# Patient Record
Sex: FEMALE | Race: WHITE | NOT HISPANIC OR LATINO | Employment: FULL TIME | URBAN - NONMETROPOLITAN AREA
[De-identification: names, ages, dates, MRNs, and addresses within clinical notes are randomized per-mention and may not be internally consistent; named-entity substitution may affect disease eponyms.]

---

## 2017-03-02 ENCOUNTER — OFFICE VISIT (OUTPATIENT)
Dept: OPHTHALMOLOGY | Facility: CLINIC | Age: 32
End: 2017-03-02

## 2017-03-02 DIAGNOSIS — H52.203 ASTIGMATISM, BILATERAL: ICD-10-CM

## 2017-03-02 DIAGNOSIS — F33.9 EPISODE OF RECURRENT MAJOR DEPRESSIVE DISORDER, UNSPECIFIED DEPRESSION EPISODE SEVERITY (HCC): ICD-10-CM

## 2017-03-02 DIAGNOSIS — H53.8 BLURRED VISION: Primary | ICD-10-CM

## 2017-03-02 DIAGNOSIS — H52.13 MYOPIA, BILATERAL: ICD-10-CM

## 2017-03-02 PROBLEM — H52.10 MYOPIA: Status: ACTIVE | Noted: 2017-03-02

## 2017-03-02 PROBLEM — H52.209 ASTIGMATISM: Status: ACTIVE | Noted: 2017-03-02

## 2017-03-02 PROCEDURE — 92002 INTRM OPH EXAM NEW PATIENT: CPT | Performed by: OPHTHALMOLOGY

## 2017-03-02 NOTE — PROGRESS NOTES
Subjective   Iris Alan is a 31 y.o. female.   Chief Complaint   Patient presents with   • Blurred Vision   • Eye Exam     HPI     Blurred Vision   Laterality: both eyes   Onset: sudden   Quality: difficult to focus   Onset: 1 day ago   Timing: in the morning           Comments   BV OU, part at near, off/on last mins x 3 days, slight soreness, wears SCL; hx of HAs and depression on Maxalt for 3 months       Last edited by Tomasz Minaya MD on 3/2/2017  5:17 PM. (History)          Review of Systems   Eyes: Positive for visual disturbance.   Neurological: Positive for headaches.   Psychiatric/Behavioral: Positive for dysphoric mood.       Objective   Visual Acuity (Snellen - Linear)      Right Left   Dist cc 20/40 20/40       Correction:  Contacts    Slow responses; OR pls OU         Manifest Refraction      Sphere Cylinder Axis Dist   Right -8.00 +2.00 080 20/30+   Left -8.75 +2.75 080 20/30+2               Pupils      Pupils   Right PERRL   Left PERRL           Confrontational Visual Fields     Visual Fields      Left Right   Result Full Full                  Extraocular Movement      Right Left   Result Full Full              Tonometry (Applanation, 5:02 PM)      Right Left   Pressure 13 13           Neuro/Psych     Mood/Affect:  Depressed         Main Ophthalmology Exam     External Exam      Right Left    External Normal Normal      Slit Lamp Exam      Right Left    Lids/Lashes Normal Normal    Conjunctiva/Sclera White and quiet White and quiet    Cornea Clear, SCL no rot, good movt Clear SCL rot 30 degr R, good movt    Anterior Chamber Deep and quiet Deep and quiet    Iris Round and reactive Round and reactive    Lens Clear Clear    Vitreous Normal Normal      Fundus Exam      Right Left    Disc Normal Normal    Macula Normal Normal    Vessels Normal Normal                Assessment/Plan   Diagnoses and all orders for this visit:    Blurred vision    Myopia, bilateral    Astigmatism,  bilateral    Episode of recurrent major depressive disorder, unspecified depression episode severity    rec no CL wear, glasses; f/u for refit if vision not improved       No Follow-up on file.

## 2017-03-08 ENCOUNTER — LAB (OUTPATIENT)
Dept: LAB | Facility: HOSPITAL | Age: 32
End: 2017-03-08

## 2017-03-08 ENCOUNTER — TELEPHONE (OUTPATIENT)
Dept: OBSTETRICS AND GYNECOLOGY | Facility: CLINIC | Age: 32
End: 2017-03-08

## 2017-03-08 DIAGNOSIS — N89.8 VAGINAL DISCHARGE: ICD-10-CM

## 2017-03-08 DIAGNOSIS — N89.8 VAGINAL DISCHARGE: Primary | ICD-10-CM

## 2017-03-08 LAB
CANDIDA ALBICANS: NEGATIVE
GARDNERELLA VAGINALIS: NEGATIVE
TRICHOMONAS VAGINALIS PCR: NEGATIVE

## 2017-03-08 PROCEDURE — 87481 CANDIDA DNA AMP PROBE: CPT | Performed by: NURSE PRACTITIONER

## 2017-03-08 PROCEDURE — 87512 GARDNER VAG DNA QUANT: CPT | Performed by: NURSE PRACTITIONER

## 2017-03-08 PROCEDURE — 87661 TRICHOMONAS VAGINALIS AMPLIF: CPT | Performed by: NURSE PRACTITIONER

## 2017-03-08 RX ORDER — OSELTAMIVIR PHOSPHATE 75 MG/1
75 CAPSULE ORAL DAILY
Qty: 10 CAPSULE | Refills: 0 | Status: SHIPPED | OUTPATIENT
Start: 2017-03-08 | End: 2017-03-18

## 2017-03-08 NOTE — TELEPHONE ENCOUNTER
----- Message from Jennifer Horan sent at 3/8/2017  9:06 AM CST -----  Contact: [phone]  Patient is wanting to an order for a swab put in.

## 2017-04-27 ENCOUNTER — OFFICE VISIT (OUTPATIENT)
Dept: FAMILY MEDICINE CLINIC | Facility: CLINIC | Age: 32
End: 2017-04-27

## 2017-04-27 VITALS
OXYGEN SATURATION: 96 % | HEIGHT: 69 IN | SYSTOLIC BLOOD PRESSURE: 116 MMHG | DIASTOLIC BLOOD PRESSURE: 80 MMHG | HEART RATE: 113 BPM

## 2017-04-27 DIAGNOSIS — H60.12 CELLULITIS OF EAR, LEFT: Primary | ICD-10-CM

## 2017-04-27 PROCEDURE — 99213 OFFICE O/P EST LOW 20 MIN: CPT | Performed by: FAMILY MEDICINE

## 2017-04-27 RX ORDER — CEPHALEXIN 500 MG/1
500 CAPSULE ORAL 2 TIMES DAILY
Qty: 20 CAPSULE | Refills: 0 | Status: SHIPPED | OUTPATIENT
Start: 2017-04-27 | End: 2017-05-07

## 2017-06-04 PROBLEM — H60.10 CELLULITIS OF EAR: Status: ACTIVE | Noted: 2017-06-04

## 2017-06-21 ENCOUNTER — LAB (OUTPATIENT)
Dept: LAB | Facility: HOSPITAL | Age: 32
End: 2017-06-21

## 2017-06-21 DIAGNOSIS — L50.9 URTICARIA: ICD-10-CM

## 2017-06-21 PROCEDURE — 86003 ALLG SPEC IGE CRUDE XTRC EA: CPT | Performed by: FAMILY MEDICINE

## 2017-06-21 PROCEDURE — 86038 ANTINUCLEAR ANTIBODIES: CPT | Performed by: FAMILY MEDICINE

## 2017-06-24 LAB
A ALTERNATA IGE QN: <0.1 KU/L
A FUMIGATUS IGE QN: <0.1 KU/L
AMER ROACH IGE QN: <0.1 KU/L
BAHIA GRASS IGE QN: <0.1 KU/L
BAYBERRY POLN IGE QN: <0.1 KU/L
BEEF IGE QN: <0.1 KU/L
BERMUDA GRASS IGE QN: <0.1 KU/L
BOXELDER IGE QN: <0.1 KU/L
C HERBARUM IGE QN: <0.1 KU/L
CAT DANDER IGG QN: <0.1 KU/L
COCOA IGE QN: <0.1 KU/L
COMMON RAGWEED IGE QN: <0.1 KU/L
CONV CLASS DESCRIPTION: NORMAL
CONV CLASS DESCRIPTION: NORMAL
CORN IGE QN: <0.1 KU/L
COW MILK IGE QN: <0.1 KU/L
D FARINAE IGE QN: <0.1 KU/L
D PTERONYSS IGE QN: <0.1 KU/L
DOG DANDER IGE QN: <0.1 KU/L
DOG FENNEL IGE QN: <0.1 KU/L
ENGL PLANTAIN IGE QN: <0.1 KU/L
FOOD ALLERG MIX2 IGE QL: NEGATIVE
GOOSEFOOT IGE QN: <0.1 KU/L
GUM-TREE IGE QN: <0.1 KU/L
ITALIAN CYPRESS IGE QN: <0.1 KU/L
JOHNSON GRASS IGE QN: <0.1 KU/L
M RACEMOSUS IGE QN: <0.1 KU/L
P NOTATUM IGE QN: <0.1 KU/L
PEANUT IGE QN: <0.1 KU/L
PEPPER TREE IGE QN: <0.1 KU/L
PER RYE GRASS IGE QN: <0.1 KU/L
PORK IGE QN: <0.1 KU/L
QUEEN PALM IGE QN: <0.1 KU/L
S BOTRYOSUM IGE QN: <0.1 KU/L
SHEEP SORREL IGE QN: <0.1 KU/L
SOYBEAN IGE QN: <0.1 KU/L
T210-IGE PRIVET, COMMON: <0.1 KU/L
VIRG LIVE OAK IGE QN: <0.1 KU/L
WHEAT IGE QN: <0.1 KU/L
WHITE ELM IGE QN: <0.1 KU/L
WHOLE EGG IGE QN: <0.1 KU/L

## 2017-06-26 LAB
ALPHA GAL IGE: <0.1 KU/L
BEEF IGE QN: <0.1 KU/L
LAMB IGE QN: <0.1 KU/L
Lab: 0
PORK IGE: <0.1 KU/L

## 2017-07-28 ENCOUNTER — LAB (OUTPATIENT)
Dept: LAB | Facility: HOSPITAL | Age: 32
End: 2017-07-28
Attending: SURGERY

## 2017-07-28 ENCOUNTER — CONSULT (OUTPATIENT)
Dept: SURGERY | Facility: CLINIC | Age: 32
End: 2017-07-28

## 2017-07-28 ENCOUNTER — APPOINTMENT (OUTPATIENT)
Dept: LAB | Facility: HOSPITAL | Age: 32
End: 2017-07-28

## 2017-07-28 ENCOUNTER — HOSPITAL ENCOUNTER (OUTPATIENT)
Dept: CT IMAGING | Facility: HOSPITAL | Age: 32
Discharge: HOME | End: 2017-07-28
Admitting: SURGERY

## 2017-07-28 ENCOUNTER — DOCUMENTATION (OUTPATIENT)
Dept: SURGERY | Facility: CLINIC | Age: 32
End: 2017-07-28

## 2017-07-28 VITALS
WEIGHT: 259 LBS | DIASTOLIC BLOOD PRESSURE: 84 MMHG | BODY MASS INDEX: 38.36 KG/M2 | SYSTOLIC BLOOD PRESSURE: 122 MMHG | HEIGHT: 69 IN

## 2017-07-28 DIAGNOSIS — R10.84 GENERALIZED ABDOMINAL PAIN: ICD-10-CM

## 2017-07-28 DIAGNOSIS — R10.84 GENERALIZED ABDOMINAL PAIN: Primary | ICD-10-CM

## 2017-07-28 DIAGNOSIS — R10.9 RIGHT FLANK PAIN: Primary | ICD-10-CM

## 2017-07-28 LAB
ALBUMIN SERPL-MCNC: 4.5 G/DL (ref 3.4–4.8)
ALBUMIN/GLOB SERPL: 1.2 G/DL (ref 1.1–1.8)
ALP SERPL-CCNC: 81 U/L (ref 38–126)
ALT SERPL W P-5'-P-CCNC: 40 U/L (ref 9–52)
ANION GAP SERPL CALCULATED.3IONS-SCNC: 12 MMOL/L (ref 5–15)
AST SERPL-CCNC: 30 U/L (ref 14–36)
BACTERIA UR QL AUTO: ABNORMAL /HPF
BASOPHILS # BLD AUTO: 0.02 10*3/MM3 (ref 0–0.2)
BASOPHILS NFR BLD AUTO: 0.2 % (ref 0–2)
BILIRUB SERPL-MCNC: 0.4 MG/DL (ref 0.2–1.3)
BILIRUB UR QL STRIP: NEGATIVE
BUN BLD-MCNC: 18 MG/DL (ref 7–21)
BUN/CREAT SERPL: 24.7 (ref 7–25)
CALCIUM SPEC-SCNC: 10 MG/DL (ref 8.4–10.2)
CHLORIDE SERPL-SCNC: 99 MMOL/L (ref 95–110)
CLARITY UR: CLEAR
CO2 SERPL-SCNC: 28 MMOL/L (ref 22–31)
COLOR UR: YELLOW
CREAT BLD-MCNC: 0.73 MG/DL (ref 0.5–1)
DEPRECATED RDW RBC AUTO: 42.4 FL (ref 36.4–46.3)
EOSINOPHIL # BLD AUTO: 0.13 10*3/MM3 (ref 0–0.7)
EOSINOPHIL NFR BLD AUTO: 1.2 % (ref 0–7)
ERYTHROCYTE [DISTWIDTH] IN BLOOD BY AUTOMATED COUNT: 13.4 % (ref 11.5–14.5)
GFR SERPL CREATININE-BSD FRML MDRD: 92 ML/MIN/1.73 (ref 64–149)
GLOBULIN UR ELPH-MCNC: 3.9 GM/DL (ref 2.3–3.5)
GLUCOSE BLD-MCNC: 93 MG/DL (ref 60–100)
GLUCOSE UR STRIP-MCNC: NEGATIVE MG/DL
HCT VFR BLD AUTO: 39.2 % (ref 35–45)
HGB BLD-MCNC: 13 G/DL (ref 12–15.5)
HGB UR QL STRIP.AUTO: NEGATIVE
HYALINE CASTS UR QL AUTO: ABNORMAL /LPF
IMM GRANULOCYTES # BLD: 0.03 10*3/MM3 (ref 0–0.02)
IMM GRANULOCYTES NFR BLD: 0.3 % (ref 0–0.5)
KETONES UR QL STRIP: NEGATIVE
LEUKOCYTE ESTERASE UR QL STRIP.AUTO: NEGATIVE
LYMPHOCYTES # BLD AUTO: 3.35 10*3/MM3 (ref 0.6–4.2)
LYMPHOCYTES NFR BLD AUTO: 31.9 % (ref 10–50)
MCH RBC QN AUTO: 28.9 PG (ref 26.5–34)
MCHC RBC AUTO-ENTMCNC: 33.2 G/DL (ref 31.4–36)
MCV RBC AUTO: 87.1 FL (ref 80–98)
MONOCYTES # BLD AUTO: 0.61 10*3/MM3 (ref 0–0.9)
MONOCYTES NFR BLD AUTO: 5.8 % (ref 0–12)
NEUTROPHILS # BLD AUTO: 6.35 10*3/MM3 (ref 2–8.6)
NEUTROPHILS NFR BLD AUTO: 60.6 % (ref 37–80)
NITRITE UR QL STRIP: NEGATIVE
PH UR STRIP.AUTO: 6 [PH] (ref 5–9)
PLATELET # BLD AUTO: 351 10*3/MM3 (ref 150–450)
PMV BLD AUTO: 9.6 FL (ref 8–12)
POTASSIUM BLD-SCNC: 4.4 MMOL/L (ref 3.5–5.1)
PROT SERPL-MCNC: 8.4 G/DL (ref 6.3–8.6)
PROT UR QL STRIP: NEGATIVE
RBC # BLD AUTO: 4.5 10*6/MM3 (ref 3.77–5.16)
RBC # UR: ABNORMAL /HPF
REF LAB TEST METHOD: ABNORMAL
SODIUM BLD-SCNC: 139 MMOL/L (ref 137–145)
SP GR UR STRIP: 1.03 (ref 1–1.03)
SQUAMOUS #/AREA URNS HPF: ABNORMAL /HPF
UROBILINOGEN UR QL STRIP: NORMAL
WBC NRBC COR # BLD: 10.49 10*3/MM3 (ref 3.2–9.8)
WBC UR QL AUTO: ABNORMAL /HPF

## 2017-07-28 PROCEDURE — 81001 URINALYSIS AUTO W/SCOPE: CPT

## 2017-07-28 PROCEDURE — 74176 CT ABD & PELVIS W/O CONTRAST: CPT

## 2017-07-28 PROCEDURE — 36415 COLL VENOUS BLD VENIPUNCTURE: CPT

## 2017-07-28 PROCEDURE — 99204 OFFICE O/P NEW MOD 45 MIN: CPT | Performed by: SURGERY

## 2017-07-28 PROCEDURE — 80053 COMPREHEN METABOLIC PANEL: CPT

## 2017-07-28 PROCEDURE — 85025 COMPLETE CBC W/AUTO DIFF WBC: CPT

## 2017-07-28 RX ORDER — HYDROCODONE BITARTRATE AND ACETAMINOPHEN 5; 325 MG/1; MG/1
1 TABLET ORAL EVERY 6 HOURS PRN
COMMUNITY
End: 2017-09-26

## 2017-07-28 RX ORDER — OXYCODONE HYDROCHLORIDE AND ACETAMINOPHEN 5; 325 MG/1; MG/1
1 TABLET ORAL EVERY 6 HOURS PRN
Qty: 20 TABLET | Refills: 0 | Status: SHIPPED | OUTPATIENT
Start: 2017-07-28 | End: 2017-09-26

## 2017-07-28 NOTE — PROGRESS NOTES
CHIEF COMPLAINT:    Right Flank pain, gallstones    HISTORY OF PRESENT ILLNESS:    Iris Alan is a 32 y.o. female who has had 1 week of right flank pain.  She says nothing makes it better or worse.  It has progressively gotten worse over the last week.  She was seen in an urgent care last week and told she had blood in her urine and was given antibiotics for a UTI.  There was no improvement so she saw her PCP earlier this week.  She was given Lortab, again with no improvement.  An ultrasound of the gallbladder was done showing multiple stones but no wall thickening or pericholecystic fluid was seen per the report given to me.      She continues to have constant right flank pain without any relief.  She has some loss of apetite.  No fevers or chills.    Past Medical History:   Diagnosis Date   • Abnormal weight gain    • Acquired keratosis pilaris    • Adult BMI > 30    • Dysuria    • KATHY (generalized anxiety disorder)    • Headache    • Low back pain    • Need for immunization against influenza    • Rash    • Recurrent major depression    • Spasm of back muscles    • Trochanteric tendinitis     bursitis       Past Surgical History:   Procedure Laterality Date   •  SECTION  2006   • HYSTEROSCOPY  2009   • INJECTION OF MEDICATION  2016    kenalog   • INJECTION OF MEDICATION  2016    toradol   • INJECTION OF MEDICATION  2016    zofran   • LAPAROSCOPY DIAGNOSTIC / BIOPSY / ASPIRATION / LYSIS  2013   • PAP SMEAR  2012   • SALPINGO OOPHORECTOMY  2013   • TOTAL ABDOMINAL HYSTERECTOMY  2014         Current Outpatient Prescriptions:   •  HYDROcodone-acetaminophen (NORCO) 5-325 MG per tablet, Take 1 tablet by mouth Every 6 (Six) Hours As Needed., Disp: , Rfl:   •  diphenhydrAMINE (BENADRYL) 25 mg capsule, Take 25 mg by mouth., Disp: , Rfl:   •  oxyCODONE-acetaminophen (PERCOCET) 5-325 MG per tablet, Take 1 tablet by mouth Every 6 (Six) Hours As Needed  (pain)., Disp: 20 tablet, Rfl: 0  •  PredniSONE (DELTASONE) 10 MG (21) tablet pack, Use as directed on package, Disp: 21 tablet, Rfl: 0  •  triamcinolone (KENALOG) 0.1 % cream, Apply  topically 3 (Three) Times a Day., Disp: 15 g, Rfl: 0    No Known Allergies    Family History   Problem Relation Age of Onset   • Depression Mother    • Diabetes Mother    • Breast cancer Maternal Grandmother    • Coronary artery disease Maternal Grandmother    • Diabetes Maternal Grandmother    • Hypertension Maternal Grandmother    • Breast cancer Other        Social History     Social History   • Marital status: Single     Spouse name: N/A   • Number of children: N/A   • Years of education: N/A     Occupational History   • Not on file.     Social History Main Topics   • Smoking status: Former Smoker     Types: Cigarettes   • Smokeless tobacco: Not on file   • Alcohol use No   • Drug use: No   • Sexual activity: Not on file     Other Topics Concern   • Not on file     Social History Narrative       Review of Systems   Constitutional: Negative for appetite change, chills, fever and unexpected weight change.   HENT: Negative for hearing loss, nosebleeds and trouble swallowing.    Eyes: Negative for visual disturbance.   Respiratory: Negative for apnea, cough, choking, chest tightness, shortness of breath, wheezing and stridor.    Cardiovascular: Negative for chest pain, palpitations and leg swelling.   Gastrointestinal: Positive for abdominal pain and nausea. Negative for abdominal distention, blood in stool, constipation, diarrhea and vomiting.   Endocrine: Negative for cold intolerance, heat intolerance, polydipsia, polyphagia and polyuria.   Genitourinary: Positive for difficulty urinating and hematuria. Negative for dysuria, frequency and urgency.   Musculoskeletal: Positive for back pain. Negative for arthralgias, myalgias and neck pain.   Skin: Negative for color change, pallor and rash.   Allergic/Immunologic: Negative for  "immunocompromised state.   Neurological: Negative for dizziness, seizures, syncope, light-headedness, numbness and headaches.   Hematological: Negative for adenopathy.   Psychiatric/Behavioral: Negative for suicidal ideas. The patient is not nervous/anxious.        Objective     /84  Ht 69\" (175.3 cm)  Wt 259 lb (117 kg)  LMP  (LMP Unknown)  BMI 38.25 kg/m2    Physical Exam   Constitutional: She is oriented to person, place, and time. She appears well-developed and well-nourished. She appears distressed.   HENT:   Head: Normocephalic and atraumatic.   Eyes: Conjunctivae and EOM are normal. Pupils are equal, round, and reactive to light. Right eye exhibits no discharge. Left eye exhibits no discharge.   Neck: Normal range of motion. Neck supple. No JVD present. No tracheal deviation present. No thyromegaly present.   Cardiovascular: Normal rate, regular rhythm and normal heart sounds.  Exam reveals no gallop and no friction rub.    No murmur heard.  Pulmonary/Chest: Effort normal and breath sounds normal. No respiratory distress. She has no wheezes. She has no rales. She exhibits no tenderness.   Abdominal: Soft. She exhibits no distension and no mass. There is no tenderness. There is no rebound and no guarding. No hernia.   No tenderness to palpation but patient states pain in right flank and subcostal margin in axillary line. No RUQ pain. No Castellon's sign   Musculoskeletal: Normal range of motion. She exhibits no edema, tenderness or deformity.   Neurological: She is alert and oriented to person, place, and time. No cranial nerve deficit.   Skin: Skin is warm and dry. No rash noted. She is not diaphoretic. No erythema. No pallor.   Psychiatric: She has a normal mood and affect. Her behavior is normal. Judgment and thought content normal.       DIAGNOSTIC DATA:    US report reviewed showing gallstones.    ASSESSMENT:    Right Flank Pain, possible kidney stones    PLAN:    She does have gallstones, though " this presentation does not match symptomatic stones, nor cholecystitis.  I suspect she has a kidney stone given the constant flank pain and the blood reported in her urine.  Will check labs and CT with no contrast to look for stone.  Spoke to her PCP Dr. Brenda García as well.  Rx given for pain meds.  Will make plans pending results of studies.          This document has been electronically signed by Luis Cummins MD on July 28, 2017 3:03 PM

## 2017-08-01 ENCOUNTER — OFFICE VISIT (OUTPATIENT)
Dept: SURGERY | Facility: CLINIC | Age: 32
End: 2017-08-01

## 2017-08-01 VITALS
HEIGHT: 69 IN | DIASTOLIC BLOOD PRESSURE: 74 MMHG | WEIGHT: 262 LBS | BODY MASS INDEX: 38.8 KG/M2 | SYSTOLIC BLOOD PRESSURE: 124 MMHG

## 2017-08-01 DIAGNOSIS — Z09 FOLLOW UP: Primary | ICD-10-CM

## 2017-08-01 PROCEDURE — 99212 OFFICE O/P EST SF 10 MIN: CPT | Performed by: SURGERY

## 2017-08-01 NOTE — PROGRESS NOTES
CHIEF COMPLAINT:    Chief Complaint   Patient presents with   • Follow-up     Marce right flank pain, CT & lab results.       HISTORY OF PRESENT ILLNESS:    Iris Alan is a 32 y.o. female who underwent CT and lab work last week to workup right flank pain.  I attempted to contact her last Friday regarding these results and she was unreachable.  She continues to have pain, which she now says is in the lower lumbar area and is bilateral.  Her CT last week showed only an ovarian cyst.  Her pain is improved somewhat but still present.    EXAM:  Vitals:    08/01/17 1046   BP: 124/74         Abdomen soft, tenderness overlying lower lumbar area    ASSESSMENT:    Lumbar back pain    PLAN:    She does not have a general surgical source for her back, which is now isolated to her back.  I will see her prn if she would like to have her gallbladder removed at some point when her other pain has resolved.          This document has been electronically signed by Luis Cummins MD on August 1, 2017 11:55 AM

## 2017-08-08 ENCOUNTER — TELEPHONE (OUTPATIENT)
Dept: OBSTETRICS AND GYNECOLOGY | Facility: CLINIC | Age: 32
End: 2017-08-08

## 2017-08-08 ENCOUNTER — LAB (OUTPATIENT)
Dept: LAB | Facility: HOSPITAL | Age: 32
End: 2017-08-08

## 2017-08-08 DIAGNOSIS — R30.0 DYSURIA: ICD-10-CM

## 2017-08-08 DIAGNOSIS — N89.8 VAGINAL DISCHARGE: Primary | ICD-10-CM

## 2017-08-08 DIAGNOSIS — N89.8 VAGINAL DISCHARGE: ICD-10-CM

## 2017-08-08 DIAGNOSIS — R30.0 DYSURIA: Primary | ICD-10-CM

## 2017-08-08 LAB
BILIRUB UR QL STRIP: ABNORMAL
CANDIDA ALBICANS: NEGATIVE
CLARITY UR: CLEAR
COLOR UR: YELLOW
GARDNERELLA VAGINALIS: POSITIVE
GLUCOSE UR STRIP-MCNC: NEGATIVE MG/DL
HGB UR QL STRIP.AUTO: ABNORMAL
KETONES UR QL STRIP: ABNORMAL
LEUKOCYTE ESTERASE UR QL STRIP.AUTO: ABNORMAL
NITRITE UR QL STRIP: NEGATIVE
PH UR STRIP.AUTO: 5.5 [PH] (ref 5–9)
PROT UR QL STRIP: NEGATIVE
SP GR UR STRIP: 1.03 (ref 1–1.03)
TRICHOMONAS VAGINALIS PCR: NEGATIVE
UROBILINOGEN UR QL STRIP: ABNORMAL

## 2017-08-08 PROCEDURE — 87660 TRICHOMONAS VAGIN DIR PROBE: CPT | Performed by: NURSE PRACTITIONER

## 2017-08-08 PROCEDURE — 87086 URINE CULTURE/COLONY COUNT: CPT | Performed by: NURSE PRACTITIONER

## 2017-08-08 PROCEDURE — 87510 GARDNER VAG DNA DIR PROBE: CPT | Performed by: NURSE PRACTITIONER

## 2017-08-08 PROCEDURE — 87480 CANDIDA DNA DIR PROBE: CPT | Performed by: NURSE PRACTITIONER

## 2017-08-08 PROCEDURE — 81003 URINALYSIS AUTO W/O SCOPE: CPT | Performed by: NURSE PRACTITIONER

## 2017-08-08 RX ORDER — METRONIDAZOLE 500 MG/1
500 TABLET ORAL 2 TIMES DAILY
Qty: 14 TABLET | Refills: 0 | Status: SHIPPED | OUTPATIENT
Start: 2017-08-08 | End: 2017-08-15

## 2017-08-08 NOTE — TELEPHONE ENCOUNTER
----- Message from Jennifer Horan sent at 8/8/2017 10:23 AM CDT -----  Contact: [phone]  PATIENT IS WANTING TO GET A TEST PUT IN FOR INFECTION IN THE LAB

## 2017-08-08 NOTE — TELEPHONE ENCOUNTER
----- Message from LEN Santiago sent at 8/8/2017  4:05 PM CDT -----  Please send flagyl 500mg po bid x7 days for BV. Urine culture pending.

## 2017-08-09 LAB — BACTERIA SPEC AEROBE CULT: NORMAL

## 2017-09-20 ENCOUNTER — LAB (OUTPATIENT)
Dept: LAB | Facility: HOSPITAL | Age: 32
End: 2017-09-20

## 2017-09-20 ENCOUNTER — TELEPHONE (OUTPATIENT)
Dept: OBSTETRICS AND GYNECOLOGY | Facility: CLINIC | Age: 32
End: 2017-09-20

## 2017-09-20 DIAGNOSIS — R31.9 BLOOD IN URINE: ICD-10-CM

## 2017-09-20 DIAGNOSIS — N89.8 VAGINAL DISCHARGE: Primary | ICD-10-CM

## 2017-09-20 DIAGNOSIS — N89.8 VAGINAL DISCHARGE: ICD-10-CM

## 2017-09-20 LAB
CANDIDA ALBICANS: NEGATIVE
GARDNERELLA VAGINALIS: NEGATIVE
TRICHOMONAS VAGINALIS PCR: NEGATIVE

## 2017-09-20 PROCEDURE — 87660 TRICHOMONAS VAGIN DIR PROBE: CPT | Performed by: NURSE PRACTITIONER

## 2017-09-20 PROCEDURE — 87109 MYCOPLASMA: CPT | Performed by: NURSE PRACTITIONER

## 2017-09-20 PROCEDURE — 87480 CANDIDA DNA DIR PROBE: CPT | Performed by: NURSE PRACTITIONER

## 2017-09-20 PROCEDURE — 87510 GARDNER VAG DNA DIR PROBE: CPT | Performed by: NURSE PRACTITIONER

## 2017-09-25 ENCOUNTER — TELEPHONE (OUTPATIENT)
Dept: OBSTETRICS AND GYNECOLOGY | Facility: CLINIC | Age: 32
End: 2017-09-25

## 2017-09-25 NOTE — TELEPHONE ENCOUNTER
----- Message from LEN Santiago sent at 9/25/2017  9:50 AM CDT -----  Doxycyline 100mg po bid x7 days

## 2017-09-26 ENCOUNTER — APPOINTMENT (OUTPATIENT)
Dept: PREADMISSION TESTING | Facility: HOSPITAL | Age: 32
End: 2017-09-26

## 2017-09-26 ENCOUNTER — OFFICE VISIT (OUTPATIENT)
Dept: SURGERY | Facility: CLINIC | Age: 32
End: 2017-09-26

## 2017-09-26 ENCOUNTER — TELEPHONE (OUTPATIENT)
Dept: OBSTETRICS AND GYNECOLOGY | Facility: CLINIC | Age: 32
End: 2017-09-26

## 2017-09-26 VITALS
SYSTOLIC BLOOD PRESSURE: 128 MMHG | BODY MASS INDEX: 37.92 KG/M2 | DIASTOLIC BLOOD PRESSURE: 90 MMHG | WEIGHT: 256 LBS | HEART RATE: 86 BPM | OXYGEN SATURATION: 100 % | HEIGHT: 69 IN | RESPIRATION RATE: 18 BRPM

## 2017-09-26 VITALS
BODY MASS INDEX: 38.8 KG/M2 | HEIGHT: 69 IN | WEIGHT: 262 LBS | DIASTOLIC BLOOD PRESSURE: 80 MMHG | SYSTOLIC BLOOD PRESSURE: 122 MMHG

## 2017-09-26 DIAGNOSIS — R10.11 RIGHT UPPER QUADRANT ABDOMINAL PAIN: Primary | ICD-10-CM

## 2017-09-26 LAB
MYCOPLASMA HOMINIS: NEGATIVE
UREAPLASMA UREALYTICUM: POSITIVE

## 2017-09-26 PROCEDURE — 99213 OFFICE O/P EST LOW 20 MIN: CPT | Performed by: SURGERY

## 2017-09-26 RX ORDER — NABUMETONE 500 MG/1
500 TABLET, FILM COATED ORAL 2 TIMES DAILY PRN
COMMUNITY
End: 2018-06-14 | Stop reason: HOSPADM

## 2017-09-26 RX ORDER — DOXYCYCLINE HYCLATE 100 MG/1
100 CAPSULE ORAL 2 TIMES DAILY
COMMUNITY
End: 2018-04-03 | Stop reason: SDUPTHER

## 2017-09-26 RX ORDER — DOXYCYCLINE HYCLATE 100 MG/1
100 CAPSULE ORAL 2 TIMES DAILY
Qty: 14 CAPSULE | Refills: 0 | Status: SHIPPED | OUTPATIENT
Start: 2017-09-26 | End: 2017-09-26

## 2017-09-26 RX ORDER — SODIUM CHLORIDE 9 MG/ML
100 INJECTION, SOLUTION INTRAVENOUS CONTINUOUS
Status: CANCELLED | OUTPATIENT
Start: 2017-09-29

## 2017-09-26 NOTE — PROGRESS NOTES
CHIEF COMPLAINT:    Right upper quadrant abdominal pain    HISTORY OF PRESENT ILLNESS:    Iris Alan is a 32 y.o. female who was seen previously for right flank and pelvic pain.  At that time it was felt that she had a urologic issue.  She has been seen by Dr. Wilcox.  The pain is now improved.  During the workup for the patient was noted to have gallstones on ultrasound of the gallbladder.  At that time I did not feel that she was symptomatically stones.  She states that over the past few weeks she's been having increasing right upper quadrant abdominal pain following meals.  This is associated with some nausea.  This is particularly with fatty or greasy meals.  As the pain is becoming more severe and more frequent she has sought reevaluation for possible cholecystectomy for symptomatic gallstones.    Past Medical History:   Diagnosis Date   • Abnormal weight gain    • Acquired keratosis pilaris    • Adult BMI > 30    • Dysuria    • KATHY (generalized anxiety disorder)    • Headache    • Low back pain    • Need for immunization against influenza    • Rash    • Recurrent major depression    • Spasm of back muscles    • Trochanteric tendinitis     bursitis       Past Surgical History:   Procedure Laterality Date   •  SECTION  2006   • HYSTEROSCOPY  2009   • INJECTION OF MEDICATION  2016    kenalog   • INJECTION OF MEDICATION  2016    toradol   • INJECTION OF MEDICATION  2016    zofran   • LAPAROSCOPY DIAGNOSTIC / BIOPSY / ASPIRATION / LYSIS  2013   • PAP SMEAR  2012   • SALPINGO OOPHORECTOMY  2013   • TOTAL ABDOMINAL HYSTERECTOMY  2014         Current Outpatient Prescriptions:   •  diphenhydrAMINE (BENADRYL) 25 mg capsule, Take 25 mg by mouth., Disp: , Rfl:   •  HYDROcodone-acetaminophen (NORCO) 5-325 MG per tablet, Take 1 tablet by mouth Every 6 (Six) Hours As Needed., Disp: , Rfl:   •  oxyCODONE-acetaminophen (PERCOCET) 5-325 MG per tablet, Take  1 tablet by mouth Every 6 (Six) Hours As Needed (pain)., Disp: 20 tablet, Rfl: 0  •  triamcinolone (KENALOG) 0.1 % cream, Apply  topically 3 (Three) Times a Day., Disp: 15 g, Rfl: 0    No Known Allergies    Family History   Problem Relation Age of Onset   • Depression Mother    • Diabetes Mother    • Breast cancer Maternal Grandmother    • Coronary artery disease Maternal Grandmother    • Diabetes Maternal Grandmother    • Hypertension Maternal Grandmother    • Breast cancer Other        Social History     Social History   • Marital status: Single     Spouse name: N/A   • Number of children: N/A   • Years of education: N/A     Occupational History   • Not on file.     Social History Main Topics   • Smoking status: Former Smoker     Types: Cigarettes   • Smokeless tobacco: Not on file   • Alcohol use No   • Drug use: No   • Sexual activity: Not on file     Other Topics Concern   • Not on file     Social History Narrative       Review of Systems   Constitutional: Negative for appetite change, chills, fever and unexpected weight change.   HENT: Negative for hearing loss, nosebleeds and trouble swallowing.    Eyes: Negative for visual disturbance.   Respiratory: Negative for apnea, cough, choking, chest tightness, shortness of breath, wheezing and stridor.    Cardiovascular: Negative for chest pain, palpitations and leg swelling.   Gastrointestinal: Positive for abdominal pain and nausea. Negative for abdominal distention, blood in stool, constipation, diarrhea and vomiting.   Endocrine: Negative for cold intolerance, heat intolerance, polydipsia, polyphagia and polyuria.   Genitourinary: Positive for difficulty urinating and hematuria. Negative for dysuria, frequency and urgency.   Musculoskeletal: Positive for back pain. Negative for arthralgias, myalgias and neck pain.   Skin: Negative for color change, pallor and rash.   Allergic/Immunologic: Negative for immunocompromised state.   Neurological: Negative for  "dizziness, seizures, syncope, light-headedness, numbness and headaches.   Hematological: Negative for adenopathy.   Psychiatric/Behavioral: Negative for suicidal ideas. The patient is not nervous/anxious.        Objective     /80  Ht 69\" (175.3 cm)  Wt 262 lb (119 kg)  LMP  (LMP Unknown)  BMI 38.69 kg/m2    Physical Exam   Constitutional: She is oriented to person, place, and time. She appears well-developed and well-nourished. No distress.   HENT:   Head: Normocephalic and atraumatic.   Eyes: Conjunctivae and EOM are normal. Pupils are equal, round, and reactive to light. Right eye exhibits no discharge. Left eye exhibits no discharge.   Neck: Normal range of motion. Neck supple. No JVD present. No tracheal deviation present. No thyromegaly present.   Cardiovascular: Normal rate, regular rhythm and normal heart sounds.  Exam reveals no gallop and no friction rub.    No murmur heard.  Pulmonary/Chest: Effort normal and breath sounds normal. No respiratory distress. She has no wheezes. She has no rales. She exhibits no tenderness.   Abdominal: Soft. She exhibits no distension and no mass. There is no tenderness. There is no rebound and no guarding. No hernia.   Musculoskeletal: Normal range of motion. She exhibits no edema, tenderness or deformity.   Neurological: She is alert and oriented to person, place, and time. No cranial nerve deficit.   Skin: Skin is warm and dry. No rash noted. She is not diaphoretic. No erythema. No pallor.   Psychiatric: She has a normal mood and affect. Her behavior is normal. Judgment and thought content normal.       DIAGNOSTIC DATA:    Previously reviewed ultrasound showing gallstones within the gallbladder    ASSESSMENT:    Symptomatic gallstones    PLAN:    At this point patient does report symptoms from her gallstones, with right upper quadrant abdominal pain following meals.  She desires cholecystectomy.  Risks and benefits of laparoscopic cholecystectomy with " cholangiogram and possible open operation were discussed with the patient and she is agreeable to proceeding.  She has been scheduled for 9/29/2017.          This document has been electronically signed by Luis Cummins MD on September 26, 2017 10:03 AM

## 2017-09-29 ENCOUNTER — HOSPITAL ENCOUNTER (OUTPATIENT)
Facility: HOSPITAL | Age: 32
Setting detail: HOSPITAL OUTPATIENT SURGERY
Discharge: HOME | End: 2017-09-29
Attending: SURGERY | Admitting: SURGERY

## 2017-09-29 ENCOUNTER — ANESTHESIA EVENT (OUTPATIENT)
Dept: PERIOP | Facility: HOSPITAL | Age: 32
End: 2017-09-29

## 2017-09-29 ENCOUNTER — APPOINTMENT (OUTPATIENT)
Dept: GENERAL RADIOLOGY | Facility: HOSPITAL | Age: 32
End: 2017-09-29

## 2017-09-29 ENCOUNTER — ANESTHESIA (OUTPATIENT)
Dept: PERIOP | Facility: HOSPITAL | Age: 32
End: 2017-09-29

## 2017-09-29 VITALS
HEIGHT: 69 IN | BODY MASS INDEX: 38.04 KG/M2 | RESPIRATION RATE: 18 BRPM | WEIGHT: 256.84 LBS | HEART RATE: 71 BPM | DIASTOLIC BLOOD PRESSURE: 66 MMHG | TEMPERATURE: 97.1 F | SYSTOLIC BLOOD PRESSURE: 111 MMHG | OXYGEN SATURATION: 100 %

## 2017-09-29 DIAGNOSIS — R10.11 RIGHT UPPER QUADRANT ABDOMINAL PAIN: ICD-10-CM

## 2017-09-29 PROCEDURE — 25010000002 HYDROMORPHONE PER 4 MG: Performed by: NURSE ANESTHETIST, CERTIFIED REGISTERED

## 2017-09-29 PROCEDURE — 74300 X-RAY BILE DUCTS/PANCREAS: CPT | Performed by: SURGERY

## 2017-09-29 PROCEDURE — 25010000002 ONDANSETRON PER 1 MG: Performed by: NURSE ANESTHETIST, CERTIFIED REGISTERED

## 2017-09-29 PROCEDURE — 25010000002 DEXAMETHASONE PER 1 MG: Performed by: NURSE ANESTHETIST, CERTIFIED REGISTERED

## 2017-09-29 PROCEDURE — 0 IOPAMIDOL 61 % SOLUTION: Performed by: SURGERY

## 2017-09-29 PROCEDURE — 25010000002 FENTANYL CITRATE (PF) 100 MCG/2ML SOLUTION: Performed by: NURSE ANESTHETIST, CERTIFIED REGISTERED

## 2017-09-29 PROCEDURE — 76000 FLUOROSCOPY <1 HR PHYS/QHP: CPT

## 2017-09-29 PROCEDURE — 88304 TISSUE EXAM BY PATHOLOGIST: CPT | Performed by: SURGERY

## 2017-09-29 PROCEDURE — 47563 LAPARO CHOLECYSTECTOMY/GRAPH: CPT | Performed by: SURGERY

## 2017-09-29 PROCEDURE — 25010000002 NEOSTIGMINE 10 MG/10ML SOLUTION

## 2017-09-29 PROCEDURE — 25010000002 MIDAZOLAM PER 1 MG: Performed by: NURSE ANESTHETIST, CERTIFIED REGISTERED

## 2017-09-29 PROCEDURE — 25010000002 PROPOFOL 10 MG/ML EMULSION: Performed by: NURSE ANESTHETIST, CERTIFIED REGISTERED

## 2017-09-29 PROCEDURE — 88304 TISSUE EXAM BY PATHOLOGIST: CPT | Performed by: PATHOLOGY

## 2017-09-29 RX ORDER — SODIUM CHLORIDE 9 MG/ML
100 INJECTION, SOLUTION INTRAVENOUS CONTINUOUS
Status: DISCONTINUED | OUTPATIENT
Start: 2017-09-29 | End: 2017-09-29 | Stop reason: HOSPADM

## 2017-09-29 RX ORDER — FENTANYL CITRATE 50 UG/ML
INJECTION, SOLUTION INTRAMUSCULAR; INTRAVENOUS AS NEEDED
Status: DISCONTINUED | OUTPATIENT
Start: 2017-09-29 | End: 2017-09-29 | Stop reason: SURG

## 2017-09-29 RX ORDER — ROCURONIUM BROMIDE 10 MG/ML
INJECTION, SOLUTION INTRAVENOUS AS NEEDED
Status: DISCONTINUED | OUTPATIENT
Start: 2017-09-29 | End: 2017-09-29 | Stop reason: SURG

## 2017-09-29 RX ORDER — NALOXONE HCL 0.4 MG/ML
0.2 VIAL (ML) INJECTION AS NEEDED
Status: DISCONTINUED | OUTPATIENT
Start: 2017-09-29 | End: 2017-09-29 | Stop reason: HOSPADM

## 2017-09-29 RX ORDER — DIPHENHYDRAMINE HYDROCHLORIDE 50 MG/ML
12.5 INJECTION INTRAMUSCULAR; INTRAVENOUS
Status: DISCONTINUED | OUTPATIENT
Start: 2017-09-29 | End: 2017-09-29 | Stop reason: HOSPADM

## 2017-09-29 RX ORDER — BUPIVACAINE HYDROCHLORIDE AND EPINEPHRINE 5; 5 MG/ML; UG/ML
INJECTION, SOLUTION EPIDURAL; INTRACAUDAL; PERINEURAL AS NEEDED
Status: DISCONTINUED | OUTPATIENT
Start: 2017-09-29 | End: 2017-09-29 | Stop reason: HOSPADM

## 2017-09-29 RX ORDER — ONDANSETRON 2 MG/ML
INJECTION INTRAMUSCULAR; INTRAVENOUS AS NEEDED
Status: DISCONTINUED | OUTPATIENT
Start: 2017-09-29 | End: 2017-09-29 | Stop reason: SURG

## 2017-09-29 RX ORDER — EPHEDRINE SULFATE 50 MG/ML
5 INJECTION, SOLUTION INTRAVENOUS ONCE AS NEEDED
Status: DISCONTINUED | OUTPATIENT
Start: 2017-09-29 | End: 2017-09-29 | Stop reason: HOSPADM

## 2017-09-29 RX ORDER — HYDROMORPHONE HCL 110MG/55ML
PATIENT CONTROLLED ANALGESIA SYRINGE INTRAVENOUS AS NEEDED
Status: DISCONTINUED | OUTPATIENT
Start: 2017-09-29 | End: 2017-09-29 | Stop reason: SURG

## 2017-09-29 RX ORDER — SODIUM CHLORIDE, SODIUM GLUCONATE, SODIUM ACETATE, POTASSIUM CHLORIDE, AND MAGNESIUM CHLORIDE 526; 502; 368; 37; 30 MG/100ML; MG/100ML; MG/100ML; MG/100ML; MG/100ML
INJECTION, SOLUTION INTRAVENOUS CONTINUOUS PRN
Status: DISCONTINUED | OUTPATIENT
Start: 2017-09-29 | End: 2017-09-29 | Stop reason: SURG

## 2017-09-29 RX ORDER — ONDANSETRON 2 MG/ML
4 INJECTION INTRAMUSCULAR; INTRAVENOUS ONCE AS NEEDED
Status: COMPLETED | OUTPATIENT
Start: 2017-09-29 | End: 2017-09-29

## 2017-09-29 RX ORDER — HYDROMORPHONE HCL 110MG/55ML
0.5 PATIENT CONTROLLED ANALGESIA SYRINGE INTRAVENOUS
Status: DISCONTINUED | OUTPATIENT
Start: 2017-09-29 | End: 2017-09-29 | Stop reason: HOSPADM

## 2017-09-29 RX ORDER — MIDAZOLAM HYDROCHLORIDE 1 MG/ML
INJECTION INTRAMUSCULAR; INTRAVENOUS AS NEEDED
Status: DISCONTINUED | OUTPATIENT
Start: 2017-09-29 | End: 2017-09-29 | Stop reason: SURG

## 2017-09-29 RX ORDER — ACETAMINOPHEN 325 MG/1
650 TABLET ORAL ONCE AS NEEDED
Status: DISCONTINUED | OUTPATIENT
Start: 2017-09-29 | End: 2017-09-29 | Stop reason: HOSPADM

## 2017-09-29 RX ORDER — DEXAMETHASONE SODIUM PHOSPHATE 4 MG/ML
INJECTION, SOLUTION INTRA-ARTICULAR; INTRALESIONAL; INTRAMUSCULAR; INTRAVENOUS; SOFT TISSUE AS NEEDED
Status: DISCONTINUED | OUTPATIENT
Start: 2017-09-29 | End: 2017-09-29 | Stop reason: SURG

## 2017-09-29 RX ORDER — GLYCOPYRROLATE 0.2 MG/ML
INJECTION INTRAMUSCULAR; INTRAVENOUS AS NEEDED
Status: DISCONTINUED | OUTPATIENT
Start: 2017-09-29 | End: 2017-09-29 | Stop reason: SURG

## 2017-09-29 RX ORDER — LABETALOL HYDROCHLORIDE 5 MG/ML
5 INJECTION, SOLUTION INTRAVENOUS
Status: DISCONTINUED | OUTPATIENT
Start: 2017-09-29 | End: 2017-09-29 | Stop reason: HOSPADM

## 2017-09-29 RX ORDER — PROPOFOL 10 MG/ML
VIAL (ML) INTRAVENOUS AS NEEDED
Status: DISCONTINUED | OUTPATIENT
Start: 2017-09-29 | End: 2017-09-29 | Stop reason: SURG

## 2017-09-29 RX ORDER — BACTERIOSTATIC SODIUM CHLORIDE 0.9 %
VIAL (ML) INJECTION AS NEEDED
Status: DISCONTINUED | OUTPATIENT
Start: 2017-09-29 | End: 2017-09-29 | Stop reason: HOSPADM

## 2017-09-29 RX ORDER — HYDROCODONE BITARTRATE AND ACETAMINOPHEN 5; 325 MG/1; MG/1
1-2 TABLET ORAL EVERY 4 HOURS PRN
Qty: 30 TABLET | Refills: 0 | Status: SHIPPED | OUTPATIENT
Start: 2017-09-29 | End: 2018-06-14 | Stop reason: HOSPADM

## 2017-09-29 RX ORDER — FLUMAZENIL 0.1 MG/ML
0.2 INJECTION INTRAVENOUS AS NEEDED
Status: DISCONTINUED | OUTPATIENT
Start: 2017-09-29 | End: 2017-09-29 | Stop reason: HOSPADM

## 2017-09-29 RX ORDER — ACETAMINOPHEN 650 MG/1
650 SUPPOSITORY RECTAL ONCE AS NEEDED
Status: DISCONTINUED | OUTPATIENT
Start: 2017-09-29 | End: 2017-09-29 | Stop reason: HOSPADM

## 2017-09-29 RX ORDER — LIDOCAINE HYDROCHLORIDE 20 MG/ML
INJECTION, SOLUTION INFILTRATION; PERINEURAL AS NEEDED
Status: DISCONTINUED | OUTPATIENT
Start: 2017-09-29 | End: 2017-09-29 | Stop reason: SURG

## 2017-09-29 RX ADMIN — FENTANYL CITRATE 100 MCG: 50 INJECTION, SOLUTION INTRAMUSCULAR; INTRAVENOUS at 09:52

## 2017-09-29 RX ADMIN — ROCURONIUM BROMIDE 40 MG: 10 INJECTION INTRAVENOUS at 09:25

## 2017-09-29 RX ADMIN — GLYCOPYRROLATE 0.6 MG: 0.2 INJECTION, SOLUTION INTRAMUSCULAR; INTRAVENOUS at 10:17

## 2017-09-29 RX ADMIN — ONDANSETRON 4 MG: 2 INJECTION INTRAMUSCULAR; INTRAVENOUS at 09:55

## 2017-09-29 RX ADMIN — PROPOFOL 150 MG: 10 INJECTION, EMULSION INTRAVENOUS at 09:25

## 2017-09-29 RX ADMIN — HYDROMORPHONE HYDROCHLORIDE 0.5 MG: 2 INJECTION, SOLUTION INTRAMUSCULAR; INTRAVENOUS; SUBCUTANEOUS at 11:02

## 2017-09-29 RX ADMIN — LIDOCAINE HYDROCHLORIDE 80 MG: 20 INJECTION, SOLUTION INFILTRATION; PERINEURAL at 09:25

## 2017-09-29 RX ADMIN — SODIUM CHLORIDE: 900 INJECTION, SOLUTION INTRAVENOUS at 09:17

## 2017-09-29 RX ADMIN — SODIUM CHLORIDE, SODIUM GLUCONATE, SODIUM ACETATE, POTASSIUM CHLORIDE, AND MAGNESIUM CHLORIDE: 526; 502; 368; 37; 30 INJECTION, SOLUTION INTRAVENOUS at 10:18

## 2017-09-29 RX ADMIN — ONDANSETRON 4 MG: 2 INJECTION INTRAMUSCULAR; INTRAVENOUS at 11:08

## 2017-09-29 RX ADMIN — HYDROMORPHONE HYDROCHLORIDE 0.5 MG: 2 INJECTION, SOLUTION INTRAMUSCULAR; INTRAVENOUS; SUBCUTANEOUS at 11:08

## 2017-09-29 RX ADMIN — MIDAZOLAM 2 MG: 1 INJECTION INTRAMUSCULAR; INTRAVENOUS at 09:20

## 2017-09-29 RX ADMIN — HYDROMORPHONE HYDROCHLORIDE 0.2 MG: 2 INJECTION, SOLUTION INTRAMUSCULAR; INTRAVENOUS; SUBCUTANEOUS at 10:25

## 2017-09-29 RX ADMIN — SODIUM CHLORIDE 100 ML/HR: 900 INJECTION, SOLUTION INTRAVENOUS at 08:04

## 2017-09-29 RX ADMIN — HYDROMORPHONE HYDROCHLORIDE 0.5 MG: 2 INJECTION, SOLUTION INTRAMUSCULAR; INTRAVENOUS; SUBCUTANEOUS at 10:56

## 2017-09-29 RX ADMIN — HYDROMORPHONE HYDROCHLORIDE 0.5 MG: 2 INJECTION, SOLUTION INTRAMUSCULAR; INTRAVENOUS; SUBCUTANEOUS at 10:51

## 2017-09-29 RX ADMIN — DEXAMETHASONE SODIUM PHOSPHATE 4 MG: 4 INJECTION, SOLUTION INTRAMUSCULAR; INTRAVENOUS at 09:55

## 2017-09-29 RX ADMIN — FENTANYL CITRATE 100 MCG: 50 INJECTION, SOLUTION INTRAMUSCULAR; INTRAVENOUS at 09:26

## 2017-09-29 NOTE — ANESTHESIA POSTPROCEDURE EVALUATION
Patient: Iris Alan    Procedure Summary     Date Anesthesia Start Anesthesia Stop Room / Location    09/29/17 0923 1039  [facility] OR 03 / [facility]       Procedure Diagnosis Surgeon Provider    LAPAROSCOPIC CHOLECYSTECTOMY WITH CHOLANGIOGRAM  (C-ARM#2) (N/A Abdomen) Right upper quadrant abdominal pain  (Right upper quadrant abdominal pain [R10.11]) MD Robert Lakhani MD          Anesthesia Type: general  Last vitals  BP        Temp        Pulse       Resp        SpO2          Post Anesthesia Care and Evaluation    Patient location during evaluation: PACU  Patient participation: complete - patient participated  Level of consciousness: awake and alert  Pain management: adequate  Airway patency: patent  Anesthetic complications: No anesthetic complications    Cardiovascular status: acceptable  Respiratory status: acceptable  Hydration status: acceptable

## 2017-09-29 NOTE — ANESTHESIA PROCEDURE NOTES
Airway  Urgency: elective    Airway not difficult    General Information and Staff    Patient location during procedure: OR  CRNA: PETTY RODRIGUES    Indications and Patient Condition  Indications for airway management: airway protection    Preoxygenated: yes  Mask difficulty assessment: 2 - vent by mask + OA or adjuvant +/- NMBA    Final Airway Details  Final airway type: endotracheal airway      Successful airway: ETT  Cuffed: yes   Successful intubation technique: direct laryngoscopy  Facilitating devices/methods: intubating stylet  Blade: Bel  Blade size: #3  ETT size: 7.5 mm  Cormack-Lehane Classification: grade I - full view of glottis  Placement verified by: chest auscultation and capnometry   Measured from: lips  ETT to lips (cm): 20  Number of attempts at approach: 1

## 2017-09-29 NOTE — ANESTHESIA PREPROCEDURE EVALUATION
Anesthesia Evaluation     NPO Solid Status: > 8 hours  NPO Liquid Status: > 8 hours     Airway   Mallampati: II  TM distance: >3 FB  Neck ROM: full  no difficulty expected  Dental      Pulmonary     breath sounds clear to auscultation  Cardiovascular     Rhythm: regular  Rate: normal        Neuro/Psych  (+) headaches, psychiatric history Anxiety,    GI/Hepatic/Renal/Endo    (+) obesity,  GERD poorly controlled,     Musculoskeletal     Abdominal   (+) obese,     Abdomen: tender.   Substance History      OB/GYN          Other                                        Anesthesia Plan    ASA 2     general     intravenous induction   Anesthetic plan and risks discussed with patient.

## 2017-10-02 LAB
LAB AP CASE REPORT: NORMAL
Lab: NORMAL
PATH REPORT.FINAL DX SPEC: NORMAL
PATH REPORT.GROSS SPEC: NORMAL

## 2017-10-06 ENCOUNTER — TELEPHONE (OUTPATIENT)
Dept: SURGERY | Facility: CLINIC | Age: 32
End: 2017-10-06

## 2017-10-06 NOTE — TELEPHONE ENCOUNTER
PT HAD GB SURGERY   GOT OUT AND RAN SOME ERRANDS YESTERDAY   STARTED EXPERIENCING SHARP PAINS IN RIGHT SIDE LATE YESTERDAY   TOOK IBUPROFEN BUT IT DID NOT HELP.  SHE WAS UNCOMFORTABLE ALL LAST NIGHT   PLEASE ADVISE.  PH # [phone]

## 2017-10-12 ENCOUNTER — OFFICE VISIT (OUTPATIENT)
Dept: SURGERY | Facility: CLINIC | Age: 32
End: 2017-10-12

## 2017-10-12 VITALS
WEIGHT: 257 LBS | SYSTOLIC BLOOD PRESSURE: 136 MMHG | BODY MASS INDEX: 38.06 KG/M2 | DIASTOLIC BLOOD PRESSURE: 80 MMHG | HEIGHT: 69 IN

## 2017-10-12 DIAGNOSIS — Z09 FOLLOW UP: Primary | ICD-10-CM

## 2017-10-12 PROCEDURE — 99024 POSTOP FOLLOW-UP VISIT: CPT | Performed by: SURGERY

## 2017-10-22 NOTE — PROGRESS NOTES
CHIEF COMPLAINT:    Chief Complaint   Patient presents with   • Post-op Follow-up     Lap. Cholecystectomy on 9/29/17.       HISTORY OF PRESENT ILLNESS:    Iris Alan is a 32 y.o. female who underwent Laparoscopic cholecystectomy on 9/29/2017.  She returns today for her postoperative visit.  She has no complaints today.  Pathology showed chronic cholecystitis and cholelithiasis, this was discussed with her today.Is eating and drinking well at home, having regular bowel function. Reports no fevers or chills.      EXAM:  Vitals:    10/12/17 0939   BP: 136/80         Abdomen soft, incisions healing well    ASSESSMENT:    Status post laparoscopic cholecystectomy    PLAN:    Overall doing well.  She can return to work on Monday.  She can see us as needed.        This document has been electronically signed by Luis Cummins MD on October 22, 2017 4:56 PM

## 2017-11-10 PROCEDURE — 87086 URINE CULTURE/COLONY COUNT: CPT | Performed by: NURSE PRACTITIONER

## 2017-12-12 ENCOUNTER — TELEPHONE (OUTPATIENT)
Dept: OBSTETRICS AND GYNECOLOGY | Facility: CLINIC | Age: 32
End: 2017-12-12

## 2017-12-12 ENCOUNTER — LAB (OUTPATIENT)
Dept: LAB | Facility: HOSPITAL | Age: 32
End: 2017-12-12

## 2017-12-12 DIAGNOSIS — N89.8 VAGINAL DISCHARGE: ICD-10-CM

## 2017-12-12 DIAGNOSIS — N89.8 VAGINAL DISCHARGE: Primary | ICD-10-CM

## 2017-12-12 LAB
CANDIDA ALBICANS: NEGATIVE
GARDNERELLA VAGINALIS: NEGATIVE
TRICHOMONAS VAGINALIS PCR: NEGATIVE

## 2017-12-12 PROCEDURE — 87480 CANDIDA DNA DIR PROBE: CPT

## 2017-12-12 PROCEDURE — 87510 GARDNER VAG DNA DIR PROBE: CPT

## 2017-12-12 PROCEDURE — 87660 TRICHOMONAS VAGIN DIR PROBE: CPT

## 2017-12-12 NOTE — TELEPHONE ENCOUNTER
----- Message from Roxy Flannery sent at 12/12/2017 10:03 AM CST -----  Regarding: LAB TEST  Contact: [phone]  Thinks has bacterial infec./can you order her a swab test???

## 2018-02-16 ENCOUNTER — LAB (OUTPATIENT)
Dept: LAB | Facility: HOSPITAL | Age: 33
End: 2018-02-16

## 2018-02-16 ENCOUNTER — TRANSCRIBE ORDERS (OUTPATIENT)
Dept: LAB | Facility: HOSPITAL | Age: 33
End: 2018-02-16

## 2018-02-16 DIAGNOSIS — Z01.818 PRE-OP TESTING: Primary | ICD-10-CM

## 2018-02-16 DIAGNOSIS — Z01.818 PRE-OP TESTING: ICD-10-CM

## 2018-02-16 LAB
ALBUMIN SERPL-MCNC: 4.4 G/DL (ref 3.4–4.8)
ALBUMIN/GLOB SERPL: 1.1 G/DL (ref 1.1–1.8)
ALP SERPL-CCNC: 86 U/L (ref 38–126)
ALT SERPL W P-5'-P-CCNC: 43 U/L (ref 9–52)
ANION GAP SERPL CALCULATED.3IONS-SCNC: 14 MMOL/L (ref 5–15)
APTT PPP: 29.7 SECONDS (ref 20–40.3)
AST SERPL-CCNC: 31 U/L (ref 14–36)
BASOPHILS # BLD AUTO: 0.02 10*3/MM3 (ref 0–0.2)
BASOPHILS NFR BLD AUTO: 0.2 % (ref 0–2)
BILIRUB SERPL-MCNC: 0.3 MG/DL (ref 0.2–1.3)
BUN BLD-MCNC: 16 MG/DL (ref 7–21)
BUN/CREAT SERPL: 20.8 (ref 7–25)
CALCIUM SPEC-SCNC: 9.7 MG/DL (ref 8.4–10.2)
CHLORIDE SERPL-SCNC: 100 MMOL/L (ref 95–110)
CO2 SERPL-SCNC: 28 MMOL/L (ref 22–31)
CREAT BLD-MCNC: 0.77 MG/DL (ref 0.5–1)
DEPRECATED RDW RBC AUTO: 41.9 FL (ref 36.4–46.3)
EOSINOPHIL # BLD AUTO: 0.21 10*3/MM3 (ref 0–0.7)
EOSINOPHIL NFR BLD AUTO: 2.1 % (ref 0–7)
ERYTHROCYTE [DISTWIDTH] IN BLOOD BY AUTOMATED COUNT: 13.5 % (ref 11.5–14.5)
GFR SERPL CREATININE-BSD FRML MDRD: 86 ML/MIN/1.73 (ref 60–149)
GLOBULIN UR ELPH-MCNC: 3.9 GM/DL (ref 2.3–3.5)
GLUCOSE BLD-MCNC: 92 MG/DL (ref 60–100)
HCG INTACT+B SERPL-ACNC: <2.4 MIU/ML
HCT VFR BLD AUTO: 39.4 % (ref 35–45)
HGB BLD-MCNC: 13.4 G/DL (ref 12–15.5)
HIV1+2 AB SER QL: NEGATIVE
IMM GRANULOCYTES # BLD: 0.04 10*3/MM3 (ref 0–0.02)
IMM GRANULOCYTES NFR BLD: 0.4 % (ref 0–0.5)
INR PPP: 0.97 (ref 0.8–1.2)
LYMPHOCYTES # BLD AUTO: 3.2 10*3/MM3 (ref 0.6–4.2)
LYMPHOCYTES NFR BLD AUTO: 31.8 % (ref 10–50)
MCH RBC QN AUTO: 29.1 PG (ref 26.5–34)
MCHC RBC AUTO-ENTMCNC: 34 G/DL (ref 31.4–36)
MCV RBC AUTO: 85.5 FL (ref 80–98)
MONOCYTES # BLD AUTO: 0.51 10*3/MM3 (ref 0–0.9)
MONOCYTES NFR BLD AUTO: 5.1 % (ref 0–12)
NEUTROPHILS # BLD AUTO: 6.08 10*3/MM3 (ref 2–8.6)
NEUTROPHILS NFR BLD AUTO: 60.4 % (ref 37–80)
PLATELET # BLD AUTO: 407 10*3/MM3 (ref 150–450)
PMV BLD AUTO: 10.4 FL (ref 8–12)
POTASSIUM BLD-SCNC: 3.7 MMOL/L (ref 3.5–5.1)
PROT SERPL-MCNC: 8.3 G/DL (ref 6.3–8.6)
PROTHROMBIN TIME: 12.8 SECONDS (ref 11.1–15.3)
RBC # BLD AUTO: 4.61 10*6/MM3 (ref 3.77–5.16)
SODIUM BLD-SCNC: 142 MMOL/L (ref 137–145)
WBC NRBC COR # BLD: 10.06 10*3/MM3 (ref 3.2–9.8)

## 2018-02-16 PROCEDURE — 36415 COLL VENOUS BLD VENIPUNCTURE: CPT

## 2018-02-16 PROCEDURE — 85610 PROTHROMBIN TIME: CPT

## 2018-02-16 PROCEDURE — 85025 COMPLETE CBC W/AUTO DIFF WBC: CPT

## 2018-02-16 PROCEDURE — 85730 THROMBOPLASTIN TIME PARTIAL: CPT

## 2018-02-16 PROCEDURE — 84702 CHORIONIC GONADOTROPIN TEST: CPT

## 2018-02-16 PROCEDURE — G0432 EIA HIV-1/HIV-2 SCREEN: HCPCS

## 2018-02-16 PROCEDURE — 80053 COMPREHEN METABOLIC PANEL: CPT

## 2018-02-23 ENCOUNTER — TRANSCRIBE ORDERS (OUTPATIENT)
Dept: GENERAL RADIOLOGY | Facility: CLINIC | Age: 33
End: 2018-02-23

## 2018-02-23 DIAGNOSIS — Z01.811 PRE-OP CHEST EXAM: Primary | ICD-10-CM

## 2018-03-10 ENCOUNTER — APPOINTMENT (OUTPATIENT)
Dept: CT IMAGING | Facility: HOSPITAL | Age: 33
End: 2018-03-10

## 2018-03-10 ENCOUNTER — HOSPITAL ENCOUNTER (EMERGENCY)
Facility: HOSPITAL | Age: 33
Discharge: HOME | End: 2018-03-11
Attending: EMERGENCY MEDICINE | Admitting: EMERGENCY MEDICINE

## 2018-03-10 DIAGNOSIS — IMO0001 POSTOPERATIVE WOUND INFECTION, INITIAL ENCOUNTER: ICD-10-CM

## 2018-03-10 DIAGNOSIS — N12 PYELONEPHRITIS: Primary | ICD-10-CM

## 2018-03-10 LAB
ALBUMIN SERPL-MCNC: 3.8 G/DL (ref 3.4–4.8)
ALBUMIN/GLOB SERPL: 1 G/DL (ref 1.1–1.8)
ALP SERPL-CCNC: 76 U/L (ref 38–126)
ALT SERPL W P-5'-P-CCNC: 38 U/L (ref 9–52)
ANION GAP SERPL CALCULATED.3IONS-SCNC: 16 MMOL/L (ref 5–15)
AST SERPL-CCNC: 25 U/L (ref 14–36)
BACTERIA UR QL AUTO: ABNORMAL /HPF
BASOPHILS # BLD AUTO: 0.02 10*3/MM3 (ref 0–0.2)
BASOPHILS NFR BLD AUTO: 0.2 % (ref 0–2)
BILIRUB SERPL-MCNC: 0.2 MG/DL (ref 0.2–1.3)
BILIRUB UR QL STRIP: NEGATIVE
BUN BLD-MCNC: 8 MG/DL (ref 7–21)
BUN/CREAT SERPL: 11.8 (ref 7–25)
CALCIUM SPEC-SCNC: 9.2 MG/DL (ref 8.4–10.2)
CHLORIDE SERPL-SCNC: 98 MMOL/L (ref 95–110)
CLARITY UR: ABNORMAL
CO2 SERPL-SCNC: 26 MMOL/L (ref 22–31)
COLOR UR: YELLOW
CREAT BLD-MCNC: 0.68 MG/DL (ref 0.5–1)
D-LACTATE SERPL-SCNC: 1.9 MMOL/L (ref 0.5–2)
DEPRECATED RDW RBC AUTO: 41.9 FL (ref 36.4–46.3)
EOSINOPHIL # BLD AUTO: 0.52 10*3/MM3 (ref 0–0.7)
EOSINOPHIL NFR BLD AUTO: 4.3 % (ref 0–7)
ERYTHROCYTE [DISTWIDTH] IN BLOOD BY AUTOMATED COUNT: 13.5 % (ref 11.5–14.5)
FLUAV AG NPH QL: NEGATIVE
FLUBV AG NPH QL IA: NEGATIVE
GFR SERPL CREATININE-BSD FRML MDRD: 100 ML/MIN/1.73 (ref 64–149)
GLOBULIN UR ELPH-MCNC: 3.7 GM/DL (ref 2.3–3.5)
GLUCOSE BLD-MCNC: 102 MG/DL (ref 60–100)
GLUCOSE UR STRIP-MCNC: NEGATIVE MG/DL
HCG SERPL QL: NEGATIVE
HCT VFR BLD AUTO: 34.9 % (ref 35–45)
HGB BLD-MCNC: 11.7 G/DL (ref 12–15.5)
HGB UR QL STRIP.AUTO: ABNORMAL
HOLD SPECIMEN: NORMAL
HOLD SPECIMEN: NORMAL
HYALINE CASTS UR QL AUTO: ABNORMAL /LPF
IMM GRANULOCYTES # BLD: 0.04 10*3/MM3 (ref 0–0.02)
IMM GRANULOCYTES NFR BLD: 0.3 % (ref 0–0.5)
KETONES UR QL STRIP: NEGATIVE
LEUKOCYTE ESTERASE UR QL STRIP.AUTO: ABNORMAL
LYMPHOCYTES # BLD AUTO: 2.44 10*3/MM3 (ref 0.6–4.2)
LYMPHOCYTES NFR BLD AUTO: 20.4 % (ref 10–50)
MCH RBC QN AUTO: 28.7 PG (ref 26.5–34)
MCHC RBC AUTO-ENTMCNC: 33.5 G/DL (ref 31.4–36)
MCV RBC AUTO: 85.5 FL (ref 80–98)
MONOCYTES # BLD AUTO: 0.67 10*3/MM3 (ref 0–0.9)
MONOCYTES NFR BLD AUTO: 5.6 % (ref 0–12)
NEUTROPHILS # BLD AUTO: 8.29 10*3/MM3 (ref 2–8.6)
NEUTROPHILS NFR BLD AUTO: 69.2 % (ref 37–80)
NITRITE UR QL STRIP: NEGATIVE
PH UR STRIP.AUTO: 7 [PH] (ref 5–9)
PLATELET # BLD AUTO: 395 10*3/MM3 (ref 150–450)
PMV BLD AUTO: 9.7 FL (ref 8–12)
POTASSIUM BLD-SCNC: 3.4 MMOL/L (ref 3.5–5.1)
PROT SERPL-MCNC: 7.5 G/DL (ref 6.3–8.6)
PROT UR QL STRIP: NEGATIVE
RBC # BLD AUTO: 4.08 10*6/MM3 (ref 3.77–5.16)
RBC # UR: ABNORMAL /HPF
REF LAB TEST METHOD: ABNORMAL
SODIUM BLD-SCNC: 140 MMOL/L (ref 137–145)
SP GR UR STRIP: 1.01 (ref 1–1.03)
SQUAMOUS #/AREA URNS HPF: ABNORMAL /HPF
UROBILINOGEN UR QL STRIP: ABNORMAL
WBC NRBC COR # BLD: 11.98 10*3/MM3 (ref 3.2–9.8)
WBC UR QL AUTO: ABNORMAL /HPF
WHOLE BLOOD HOLD SPECIMEN: NORMAL
WHOLE BLOOD HOLD SPECIMEN: NORMAL

## 2018-03-10 PROCEDURE — 83605 ASSAY OF LACTIC ACID: CPT | Performed by: EMERGENCY MEDICINE

## 2018-03-10 PROCEDURE — 74177 CT ABD & PELVIS W/CONTRAST: CPT

## 2018-03-10 PROCEDURE — 85025 COMPLETE CBC W/AUTO DIFF WBC: CPT | Performed by: EMERGENCY MEDICINE

## 2018-03-10 PROCEDURE — 0 IOPAMIDOL 61 % SOLUTION: Performed by: EMERGENCY MEDICINE

## 2018-03-10 PROCEDURE — 87804 INFLUENZA ASSAY W/OPTIC: CPT | Performed by: EMERGENCY MEDICINE

## 2018-03-10 PROCEDURE — 25010000002 CEFTRIAXONE PER 250 MG: Performed by: EMERGENCY MEDICINE

## 2018-03-10 PROCEDURE — 80053 COMPREHEN METABOLIC PANEL: CPT | Performed by: EMERGENCY MEDICINE

## 2018-03-10 PROCEDURE — 99284 EMERGENCY DEPT VISIT MOD MDM: CPT

## 2018-03-10 PROCEDURE — 84703 CHORIONIC GONADOTROPIN ASSAY: CPT | Performed by: EMERGENCY MEDICINE

## 2018-03-10 PROCEDURE — 96365 THER/PROPH/DIAG IV INF INIT: CPT

## 2018-03-10 PROCEDURE — 81001 URINALYSIS AUTO W/SCOPE: CPT

## 2018-03-10 RX ORDER — SODIUM CHLORIDE 9 MG/ML
125 INJECTION, SOLUTION INTRAVENOUS CONTINUOUS
Status: DISCONTINUED | OUTPATIENT
Start: 2018-03-10 | End: 2018-03-11

## 2018-03-10 RX ORDER — CEPHALEXIN 500 MG/1
500 CAPSULE ORAL 2 TIMES DAILY
COMMUNITY
End: 2018-04-03 | Stop reason: SDUPTHER

## 2018-03-10 RX ORDER — LEVOFLOXACIN 750 MG/1
750 TABLET ORAL DAILY
Qty: 10 TABLET | Refills: 0 | Status: SHIPPED | OUTPATIENT
Start: 2018-03-10 | End: 2018-03-20

## 2018-03-10 RX ORDER — NAPROXEN SODIUM 550 MG/1
550 TABLET ORAL 2 TIMES DAILY PRN
Qty: 20 TABLET | Refills: 0 | Status: SHIPPED | OUTPATIENT
Start: 2018-03-10 | End: 2018-06-14 | Stop reason: HOSPADM

## 2018-03-10 RX ORDER — FERROUS SULFATE 325(65) MG
325 TABLET ORAL
COMMUNITY
End: 2018-09-26

## 2018-03-10 RX ORDER — SODIUM CHLORIDE 0.9 % (FLUSH) 0.9 %
10 SYRINGE (ML) INJECTION AS NEEDED
Status: DISCONTINUED | OUTPATIENT
Start: 2018-03-10 | End: 2018-03-11 | Stop reason: HOSPADM

## 2018-03-10 RX ORDER — ONDANSETRON 4 MG/1
4 TABLET, FILM COATED ORAL EVERY 8 HOURS PRN
COMMUNITY
End: 2018-06-14 | Stop reason: HOSPADM

## 2018-03-10 RX ORDER — GINSENG 100 MG
CAPSULE ORAL 2 TIMES DAILY
Qty: 1 TUBE | Refills: 0 | Status: SHIPPED | OUTPATIENT
Start: 2018-03-10 | End: 2018-06-14 | Stop reason: HOSPADM

## 2018-03-10 RX ADMIN — IOPAMIDOL 95 ML: 612 INJECTION, SOLUTION INTRAVENOUS at 22:52

## 2018-03-10 RX ADMIN — CEFTRIAXONE SODIUM 1 G: 1 INJECTION, POWDER, FOR SOLUTION INTRAMUSCULAR; INTRAVENOUS at 22:59

## 2018-03-10 RX ADMIN — SODIUM CHLORIDE 1000 ML: 9 INJECTION, SOLUTION INTRAVENOUS at 22:58

## 2018-03-11 VITALS
BODY MASS INDEX: 38.51 KG/M2 | TEMPERATURE: 98.3 F | DIASTOLIC BLOOD PRESSURE: 82 MMHG | SYSTOLIC BLOOD PRESSURE: 125 MMHG | WEIGHT: 260 LBS | OXYGEN SATURATION: 99 % | HEART RATE: 86 BPM | RESPIRATION RATE: 20 BRPM | HEIGHT: 69 IN

## 2018-03-11 RX ORDER — DIAPER,BRIEF,INFANT-TODD,DISP
EACH MISCELLANEOUS
Status: DISCONTINUED
Start: 2018-03-11 | End: 2018-03-11 | Stop reason: HOSPADM

## 2018-03-11 NOTE — ED TRIAGE NOTES
Patient presents to the ED with c/o fever and chills. Last Friday 3/2/18 the patient had a tummy tuck surgery performed in [place], [place]. Incision sites of the lower abdomen and umbilical area  appear red and warm to the touch.

## 2018-03-11 NOTE — ED PROVIDER NOTES
Subjective   33yo female POD#8 abdominoplasty presents ED c/o 1d hx fever/chills.  ROS (+) sore throat/congestion.  Denies cough/n/v/d/dysuria.  Pt reports increasing incisional erythema x1-2d duration.        Illness   Severity:  Moderate  Onset quality:  Sudden  Duration:  1 day  Associated symptoms: abdominal pain, congestion, fever and sore throat        Review of Systems   Constitutional: Positive for fever.   HENT: Positive for congestion and sore throat.    Eyes: Negative.    Respiratory: Negative.    Cardiovascular: Negative.    Gastrointestinal: Positive for abdominal pain.   Endocrine: Negative.    Genitourinary: Negative.    Musculoskeletal: Negative.    Skin: Positive for color change.   Allergic/Immunologic: Negative for immunocompromised state.       Past Medical History:   Diagnosis Date   • Abnormal weight gain    • Acquired keratosis pilaris    • Adult BMI > 30    • Dysuria    • KATHY (generalized anxiety disorder)    • Headache    • Low back pain    • Need for immunization against influenza    • Rash    • Recurrent major depression    • Spasm of back muscles    • Trochanteric tendinitis     bursitis       No Known Allergies    Past Surgical History:   Procedure Laterality Date   •  SECTION      x 3   • CHOLECYSTECTOMY WITH INTRAOPERATIVE CHOLANGIOGRAM N/A 2017    Procedure: LAPAROSCOPIC CHOLECYSTECTOMY WITH CHOLANGIOGRAM  (C-ARM#2);  Surgeon: Luis Cummins MD;  Location: [facility];  Service:    • HYSTEROSCOPY  2009   • INJECTION OF MEDICATION  2016    kenalog   • INJECTION OF MEDICATION  2016    toradol   • INJECTION OF MEDICATION  2016    zofran   • LAPAROSCOPY DIAGNOSTIC / BIOPSY / ASPIRATION / LYSIS  2013   • PAP SMEAR  2012   • SALPINGO OOPHORECTOMY  2013   • TOTAL ABDOMINAL HYSTERECTOMY  2014       Family History   Problem Relation Age of Onset   • Depression Mother    • Diabetes Mother    • Breast cancer Maternal Grandmother     • Coronary artery disease Maternal Grandmother    • Diabetes Maternal Grandmother    • Hypertension Maternal Grandmother    • Breast cancer Other        Social History     Social History   • Marital status: Single     Social History Main Topics   • Smoking status: Former Smoker     Types: Cigarettes     Quit date: 2012   • Smokeless tobacco: Never Used   • Alcohol use No   • Drug use: No   • Sexual activity: Defer     Other Topics Concern   • Not on file           Objective   Physical Exam   Constitutional: She is oriented to person, place, and time. She appears well-developed and well-nourished.   HENT:   Head: Normocephalic and atraumatic.   Nose: Nose normal.   Mouth/Throat: Oropharynx is clear and moist.   Eyes: Pupils are equal, round, and reactive to light.   Neck: Neck supple. No JVD present. No tracheal deviation present.   Cardiovascular: Normal rate, regular rhythm, normal heart sounds and intact distal pulses.  Exam reveals no gallop and no friction rub.    No murmur heard.  Pulmonary/Chest: Effort normal and breath sounds normal. She has no wheezes. She has no rales.   Abdominal: Soft. Bowel sounds are normal. She exhibits no mass. There is no rebound and no guarding.       Musculoskeletal: She exhibits no edema.   Lymphadenopathy:     She has no cervical adenopathy.   Neurological: She is alert and oriented to person, place, and time.   Skin: Skin is warm and dry. Capillary refill takes less than 2 seconds.   Nursing note and vitals reviewed.      Procedures         ED Course  ED Course        Lab Results (last 24 hours)     Procedure Component Value Units Date/Time    CBC & Differential [[id number]] Collected:  03/10/18 2142    Specimen:  Blood Updated:  03/10/18 2204    Narrative:       The following orders were created for panel order CBC & Differential.  Procedure                               Abnormality         Status                     ---------                               -----------          ------                     CBC Auto Differential[[id number]]        Abnormal            Final result                 Please view results for these tests on the individual orders.    Comprehensive Metabolic Panel [[id number]]  (Abnormal) Collected:  03/10/18 2142    Specimen:  Blood Updated:  03/10/18 2220     Glucose 102 (H) mg/dL      BUN 8 mg/dL      Creatinine 0.68 mg/dL      Sodium 140 mmol/L      Potassium 3.4 (L) mmol/L      Chloride 98 mmol/L      CO2 26.0 mmol/L      Calcium 9.2 mg/dL      Total Protein 7.5 g/dL      Albumin 3.80 g/dL      ALT (SGPT) 38 U/L      AST (SGOT) 25 U/L      Alkaline Phosphatase 76 U/L      Total Bilirubin 0.2 mg/dL      eGFR Non African Amer 100 mL/min/1.73      Globulin 3.7 (H) gm/dL      A/G Ratio 1.0 (L) g/dL      BUN/Creatinine Ratio 11.8     Anion Gap 16.0 (H) mmol/L     Lactic Acid, Plasma [[id number]]  (Normal) Collected:  03/10/18 2142    Specimen:  Blood Updated:  03/10/18 2221     Lactate 1.9 mmol/L     CBC Auto Differential [[id number]]  (Abnormal) Collected:  03/10/18 2142    Specimen:  Blood Updated:  03/10/18 2204     WBC 11.98 (H) 10*3/mm3      RBC 4.08 10*6/mm3      Hemoglobin 11.7 (L) g/dL      Hematocrit 34.9 (L) %      MCV 85.5 fL      MCH 28.7 pg      MCHC 33.5 g/dL      RDW 13.5 %      RDW-SD 41.9 fl      MPV 9.7 fL      Platelets 395 10*3/mm3      Neutrophil % 69.2 %      Lymphocyte % 20.4 %      Monocyte % 5.6 %      Eosinophil % 4.3 %      Basophil % 0.2 %      Immature Grans % 0.3 %      Neutrophils, Absolute 8.29 10*3/mm3      Lymphocytes, Absolute 2.44 10*3/mm3      Monocytes, Absolute 0.67 10*3/mm3      Eosinophils, Absolute 0.52 10*3/mm3      Basophils, Absolute 0.02 10*3/mm3      Immature Grans, Absolute 0.04 (H) 10*3/mm3     Urinalysis With / Microscopic If Indicated - Urine, Clean Catch [[id number]]  (Abnormal) Collected:  03/10/18 2142    Specimen:  Urine from Urine, Clean Catch Updated:  03/10/18 2150     Color, UA Yellow     Appearance, UA Slightly  Cloudy (A)     pH, UA 7.0     Specific Gravity, UA 1.015     Glucose, UA Negative     Ketones, UA Negative     Bilirubin, UA Negative     Blood, UA Small (1+) (A)     Protein, UA Negative     Leuk Esterase, UA Moderate (2+) (A)     Nitrite, UA Negative     Urobilinogen, UA 0.2 E.U./dL    Urinalysis, Microscopic Only - Urine, Clean Catch [[id number]]  (Abnormal) Collected:  03/10/18 2142    Specimen:  Urine from Urine, Clean Catch Updated:  03/10/18 2156     RBC, UA 6-12 (A) /HPF      WBC, UA 21-30 (A) /HPF      Bacteria, UA None Seen /HPF      Squamous Epithelial Cells, UA None Seen /HPF      Hyaline Casts, UA 3-6 /LPF      Methodology Automated Microscopy    hCG, Serum, Qualitative [[id number]]  (Normal) Collected:  03/10/18 2142    Specimen:  Blood Updated:  03/10/18 2249     HCG Qualitative Negative    Influenza Antigen, Rapid - Swab, Nasopharynx [[id number]]  (Normal) Collected:  03/10/18 2307    Specimen:  Swab from Nasopharynx Updated:  03/10/18 2323     Influenza A Ag, EIA Negative     Influenza B Ag, EIA Negative        Ct Abdomen Pelvis With Contrast    Result Date: 3/10/2018  Exam: CT abdomen pelvis with contrast. Patient: Abdominal pain TECHNIQUE: Routine CT abdomen pelvis with contrast. Sagittal coronal reconstructions were obtained. CT technique performed using ALARA. FINDINGS: The imaged portions of lower lungs are clear. The liver, spleen, pancreas kidneys adrenal glands are unremarkable. No urinary tract calculus or hydronephrosis. Status post cholecystectomy. Abdominal aorta is normal in caliber. No significant adenopathy. No bowel obstruction or abnormal bowel wall thickening. Normal appendix. Bladder is unremarkable. Status post hysterectomy. Interval disappearance of previously noted the cyst in the right adnexal region. The bony structures are intact. There is subcutaneous stranding along the anterior wall compatible with previous the abdominal wall is surgery. Recommend clinical correlation.      No obvious acute abnormality. Electronically signed by:  Lior Lancaster MD  3/10/2018 11:24 PM CST Workstation: [id number]          [facility]    Final diagnoses:   Pyelonephritis   Postoperative wound infection, initial encounter            Fredis Huber MD  03/11/18 0003

## 2018-04-03 ENCOUNTER — HOSPITAL ENCOUNTER (EMERGENCY)
Facility: HOSPITAL | Age: 33
Discharge: HOME | End: 2018-04-03
Attending: EMERGENCY MEDICINE | Admitting: EMERGENCY MEDICINE

## 2018-04-03 VITALS
HEIGHT: 69 IN | WEIGHT: 257 LBS | OXYGEN SATURATION: 99 % | SYSTOLIC BLOOD PRESSURE: 129 MMHG | HEART RATE: 100 BPM | BODY MASS INDEX: 38.06 KG/M2 | DIASTOLIC BLOOD PRESSURE: 76 MMHG | RESPIRATION RATE: 20 BRPM | TEMPERATURE: 99.1 F

## 2018-04-03 DIAGNOSIS — S31.109A OPEN WOUND OF ABDOMINAL WALL WITH COMPLICATION, INITIAL ENCOUNTER: Primary | ICD-10-CM

## 2018-04-03 PROCEDURE — 99282 EMERGENCY DEPT VISIT SF MDM: CPT

## 2018-04-03 RX ORDER — CEPHALEXIN 500 MG/1
500 CAPSULE ORAL 3 TIMES DAILY
Qty: 21 CAPSULE | Refills: 0 | Status: SHIPPED | OUTPATIENT
Start: 2018-04-03 | End: 2018-04-22

## 2018-04-03 NOTE — ED PROVIDER NOTES
"Subjective   History of Present Illness  Patient is a 32-year-old female who presents today for evaluation of the surgical wound.  She went to [facility] several weeks ago for a \"tummy tuck\".  She actually has had the return due to complications to include seroma and poor wound healing.  She presents here today because she is concerned about the wound healing, cosmesis and that she might have another seroma.  She has no fevers or other constitutional symptoms.  Any drainage has been very clear.  She is noticed that the mid portion of the surgical incision anteriorly feels more firm than the rest of the incision.  Review of Systems   Constitutional: Negative for activity change, appetite change, chills, diaphoresis, fatigue, fever and unexpected weight change.   Respiratory: Negative for apnea, cough, choking, chest tightness, shortness of breath, wheezing and stridor.    Cardiovascular: Negative for chest pain and palpitations.   All other systems reviewed and are negative.      Past Medical History:   Diagnosis Date   • Abnormal weight gain    • Acquired keratosis pilaris    • Adult BMI > 30    • Dysuria    • KATHY (generalized anxiety disorder)    • Headache    • Low back pain    • Need for immunization against influenza    • Rash    • Recurrent major depression    • Spasm of back muscles    • Trochanteric tendinitis     bursitis       No Known Allergies    Past Surgical History:   Procedure Laterality Date   •  SECTION      x 3   • CHOLECYSTECTOMY WITH INTRAOPERATIVE CHOLANGIOGRAM N/A 2017    Procedure: LAPAROSCOPIC CHOLECYSTECTOMY WITH CHOLANGIOGRAM  (C-ARM#2);  Surgeon: Luis Cummins MD;  Location: [facility];  Service:    • HYSTEROSCOPY  2009   • INJECTION OF MEDICATION  2016    kenalog   • INJECTION OF MEDICATION  2016    toradol   • INJECTION OF MEDICATION  2016    zofran   • LAPAROSCOPY DIAGNOSTIC / BIOPSY / ASPIRATION / LYSIS  2013   • PAP SMEAR  2012 "   • SALPINGO OOPHORECTOMY  04/25/2013   • TOTAL ABDOMINAL HYSTERECTOMY  04/24/2014       Family History   Problem Relation Age of Onset   • Depression Mother    • Diabetes Mother    • Breast cancer Maternal Grandmother    • Coronary artery disease Maternal Grandmother    • Diabetes Maternal Grandmother    • Hypertension Maternal Grandmother    • Breast cancer Other        Social History     Social History   • Marital status: Single     Social History Main Topics   • Smoking status: Former Smoker     Types: Cigarettes     Quit date: 2012   • Smokeless tobacco: Never Used   • Alcohol use No   • Drug use: No   • Sexual activity: Defer     Other Topics Concern   • Not on file           Objective   Physical Exam   Constitutional: She is oriented to person, place, and time. She appears well-developed and well-nourished. No distress.   Neurological: She is alert and oriented to person, place, and time. No cranial nerve deficit. She exhibits normal muscle tone. Coordination normal.   Skin: She is not diaphoretic.   Family and steal-type incision is noted.  There is no obvious drainage, erythema or foul odor.  She does not appear to have good wound healing and the vast majority of the wound although well approximated and without evidence of obvious dehiscence is healing by secondary intention.  Mild tenderness to palpation.   Psychiatric: She has a normal mood and affect. Her behavior is normal. Judgment and thought content normal.   Nursing note and vitals reviewed.      Procedures         ED Course  ED Course      I spoke with Dr. villar and patient had been referred previously.  His suggestion was that she needed a dedicated plastics repair and that the [facility] and [facility] with both the viable options.  Patient has opted to go to [facility] and I have recommended and referred her to Dr. David Atkinson MD of [facility] department of plastic  surgery.            [facility]    Final diagnoses:   Open wound of abdominal wall with complication, initial encounter            Hugo Schumacher MD  04/03/18 [id number]

## 2018-04-10 ENCOUNTER — APPOINTMENT (OUTPATIENT)
Dept: WOUND CARE | Facility: HOSPITAL | Age: 33
End: 2018-04-10

## 2018-04-13 ENCOUNTER — APPOINTMENT (OUTPATIENT)
Dept: WOUND CARE | Facility: HOSPITAL | Age: 33
End: 2018-04-13
Attending: THORACIC SURGERY (CARDIOTHORACIC VASCULAR SURGERY)

## 2018-04-13 ENCOUNTER — HOSPITAL ENCOUNTER (OUTPATIENT)
Dept: CT IMAGING | Facility: HOSPITAL | Age: 33
Discharge: HOME | End: 2018-04-13
Admitting: NURSE PRACTITIONER

## 2018-04-13 DIAGNOSIS — T81.89XD TRANSPLANT WOUND GRANULOMA, SUBSEQUENT ENCOUNTER: ICD-10-CM

## 2018-04-13 DIAGNOSIS — T81.89XA NON-HEALING SURGICAL WOUND, INITIAL ENCOUNTER: ICD-10-CM

## 2018-04-13 PROCEDURE — 74176 CT ABD & PELVIS W/O CONTRAST: CPT

## 2018-04-22 ENCOUNTER — APPOINTMENT (OUTPATIENT)
Dept: LAB | Facility: HOSPITAL | Age: 33
End: 2018-04-22

## 2018-04-22 PROCEDURE — 85025 COMPLETE CBC W/AUTO DIFF WBC: CPT | Performed by: NURSE PRACTITIONER

## 2018-04-22 PROCEDURE — 80053 COMPREHEN METABOLIC PANEL: CPT | Performed by: NURSE PRACTITIONER

## 2018-05-15 ENCOUNTER — TELEPHONE (OUTPATIENT)
Dept: OBSTETRICS AND GYNECOLOGY | Facility: CLINIC | Age: 33
End: 2018-05-15

## 2018-05-15 ENCOUNTER — LAB (OUTPATIENT)
Dept: LAB | Facility: HOSPITAL | Age: 33
End: 2018-05-15

## 2018-05-15 DIAGNOSIS — R30.0 DYSURIA: ICD-10-CM

## 2018-05-15 DIAGNOSIS — N89.8 VAGINAL DISCHARGE: ICD-10-CM

## 2018-05-15 DIAGNOSIS — N89.8 VAGINAL DISCHARGE: Primary | ICD-10-CM

## 2018-05-15 LAB
BILIRUB UR QL STRIP: NEGATIVE
CANDIDA ALBICANS: NEGATIVE
CLARITY UR: CLEAR
COLOR UR: YELLOW
GARDNERELLA VAGINALIS: POSITIVE
GLUCOSE UR STRIP-MCNC: NEGATIVE MG/DL
HGB UR QL STRIP.AUTO: ABNORMAL
KETONES UR QL STRIP: NEGATIVE
LEUKOCYTE ESTERASE UR QL STRIP.AUTO: ABNORMAL
NITRITE UR QL STRIP: NEGATIVE
PH UR STRIP.AUTO: 6 [PH] (ref 5–9)
PROT UR QL STRIP: NEGATIVE
SP GR UR STRIP: 1.02 (ref 1–1.03)
TRICHOMONAS VAGINALIS PCR: NEGATIVE
UROBILINOGEN UR QL STRIP: ABNORMAL

## 2018-05-15 PROCEDURE — 87086 URINE CULTURE/COLONY COUNT: CPT

## 2018-05-15 PROCEDURE — 87660 TRICHOMONAS VAGIN DIR PROBE: CPT

## 2018-05-15 PROCEDURE — 87186 SC STD MICRODIL/AGAR DIL: CPT

## 2018-05-15 PROCEDURE — 87077 CULTURE AEROBIC IDENTIFY: CPT

## 2018-05-15 PROCEDURE — 81003 URINALYSIS AUTO W/O SCOPE: CPT

## 2018-05-15 PROCEDURE — 87510 GARDNER VAG DNA DIR PROBE: CPT

## 2018-05-15 PROCEDURE — 87480 CANDIDA DNA DIR PROBE: CPT

## 2018-05-15 RX ORDER — METRONIDAZOLE 500 MG/1
500 TABLET ORAL 2 TIMES DAILY
Qty: 14 TABLET | Refills: 0 | Status: SHIPPED | OUTPATIENT
Start: 2018-05-15 | End: 2018-05-22

## 2018-05-15 NOTE — TELEPHONE ENCOUNTER
----- Message from LEN Santiago sent at 5/15/2018  3:35 PM CDT -----  Flagyl 500mg PO BID x7 days. Urinalysis looks ok, will call back if culture is positive.

## 2018-05-16 ENCOUNTER — TELEPHONE (OUTPATIENT)
Dept: OBSTETRICS AND GYNECOLOGY | Facility: CLINIC | Age: 33
End: 2018-05-16

## 2018-05-16 RX ORDER — SULFAMETHOXAZOLE AND TRIMETHOPRIM 800; 160 MG/1; MG/1
1 TABLET ORAL 2 TIMES DAILY
Qty: 10 TABLET | Refills: 0 | Status: SHIPPED | OUTPATIENT
Start: 2018-05-16 | End: 2018-05-21

## 2018-05-16 NOTE — TELEPHONE ENCOUNTER
----- Message from LEN Santiago sent at 5/16/2018 11:05 AM CDT -----  Bactrim DS po bid x5 days, #10

## 2018-05-17 ENCOUNTER — TELEPHONE (OUTPATIENT)
Dept: OBSTETRICS AND GYNECOLOGY | Facility: CLINIC | Age: 33
End: 2018-05-17

## 2018-05-17 LAB — BACTERIA SPEC AEROBE CULT: ABNORMAL

## 2018-05-17 RX ORDER — NITROFURANTOIN 25; 75 MG/1; MG/1
100 CAPSULE ORAL 2 TIMES DAILY
Qty: 14 CAPSULE | Refills: 0 | Status: SHIPPED | OUTPATIENT
Start: 2018-05-17 | End: 2018-05-24

## 2018-05-17 NOTE — TELEPHONE ENCOUNTER
----- Message from LEN Santiago sent at 5/17/2018  8:08 AM CDT -----  Please have her stop bactrim, it is resistant. Please send macrobid 100mg po bid x7 days.

## 2018-06-14 ENCOUNTER — OFFICE VISIT (OUTPATIENT)
Dept: FAMILY MEDICINE CLINIC | Facility: CLINIC | Age: 33
End: 2018-06-14

## 2018-06-14 VITALS
HEIGHT: 69 IN | OXYGEN SATURATION: 99 % | TEMPERATURE: 98.3 F | SYSTOLIC BLOOD PRESSURE: 120 MMHG | RESPIRATION RATE: 20 BRPM | BODY MASS INDEX: 38.55 KG/M2 | WEIGHT: 260.3 LBS | DIASTOLIC BLOOD PRESSURE: 80 MMHG | HEART RATE: 91 BPM

## 2018-06-14 DIAGNOSIS — M79.89 NODULE OF SOFT TISSUE: ICD-10-CM

## 2018-06-14 DIAGNOSIS — L83 ACANTHOSIS NIGRICANS: ICD-10-CM

## 2018-06-14 DIAGNOSIS — Z76.89 ENCOUNTER TO ESTABLISH CARE: ICD-10-CM

## 2018-06-14 DIAGNOSIS — E66.9 OBESITY (BMI 35.0-39.9 WITHOUT COMORBIDITY): ICD-10-CM

## 2018-06-14 DIAGNOSIS — R53.83 FATIGUE, UNSPECIFIED TYPE: Primary | ICD-10-CM

## 2018-06-14 DIAGNOSIS — Z13.220 SCREENING FOR HYPERCHOLESTEROLEMIA: ICD-10-CM

## 2018-06-14 PROCEDURE — 99214 OFFICE O/P EST MOD 30 MIN: CPT | Performed by: NURSE PRACTITIONER

## 2018-06-15 ENCOUNTER — LAB (OUTPATIENT)
Dept: LAB | Facility: HOSPITAL | Age: 33
End: 2018-06-15

## 2018-06-15 DIAGNOSIS — L83 ACANTHOSIS NIGRICANS: ICD-10-CM

## 2018-06-15 DIAGNOSIS — Z13.220 SCREENING FOR HYPERCHOLESTEROLEMIA: ICD-10-CM

## 2018-06-15 DIAGNOSIS — R53.83 FATIGUE, UNSPECIFIED TYPE: ICD-10-CM

## 2018-06-15 LAB
ALBUMIN SERPL-MCNC: 4 G/DL (ref 3.4–4.8)
ALBUMIN/GLOB SERPL: 1.2 G/DL (ref 1.1–1.8)
ALP SERPL-CCNC: 62 U/L (ref 38–126)
ALT SERPL W P-5'-P-CCNC: 34 U/L (ref 9–52)
ANION GAP SERPL CALCULATED.3IONS-SCNC: 11 MMOL/L (ref 5–15)
ARTICHOKE IGE QN: 121 MG/DL (ref 1–129)
AST SERPL-CCNC: 32 U/L (ref 14–36)
BASOPHILS # BLD AUTO: 0.01 10*3/MM3 (ref 0–0.2)
BASOPHILS NFR BLD AUTO: 0.2 % (ref 0–2)
BILIRUB SERPL-MCNC: 0.4 MG/DL (ref 0.2–1.3)
BUN BLD-MCNC: 11 MG/DL (ref 7–21)
BUN/CREAT SERPL: 18 (ref 7–25)
CALCIUM SPEC-SCNC: 9.1 MG/DL (ref 8.4–10.2)
CHLORIDE SERPL-SCNC: 101 MMOL/L (ref 95–110)
CHOLEST SERPL-MCNC: 186 MG/DL (ref 0–199)
CO2 SERPL-SCNC: 29 MMOL/L (ref 22–31)
CREAT BLD-MCNC: 0.61 MG/DL (ref 0.5–1)
DEPRECATED RDW RBC AUTO: 44.4 FL (ref 36.4–46.3)
EOSINOPHIL # BLD AUTO: 0.15 10*3/MM3 (ref 0–0.7)
EOSINOPHIL NFR BLD AUTO: 2.4 % (ref 0–7)
ERYTHROCYTE [DISTWIDTH] IN BLOOD BY AUTOMATED COUNT: 14.7 % (ref 11.5–14.5)
GFR SERPL CREATININE-BSD FRML MDRD: 112 ML/MIN/1.73 (ref 64–149)
GLOBULIN UR ELPH-MCNC: 3.4 GM/DL (ref 2.3–3.5)
GLUCOSE BLD-MCNC: 109 MG/DL (ref 60–100)
HBA1C MFR BLD: 5.6 % (ref 4–5.6)
HCT VFR BLD AUTO: 37.6 % (ref 35–45)
HDLC SERPL-MCNC: 34 MG/DL (ref 60–200)
HGB BLD-MCNC: 12.5 G/DL (ref 12–15.5)
IMM GRANULOCYTES # BLD: 0.01 10*3/MM3 (ref 0–0.02)
IMM GRANULOCYTES NFR BLD: 0.2 % (ref 0–0.5)
LDLC/HDLC SERPL: 3.65 {RATIO} (ref 0–3.22)
LYMPHOCYTES # BLD AUTO: 2.08 10*3/MM3 (ref 0.6–4.2)
LYMPHOCYTES NFR BLD AUTO: 32.7 % (ref 10–50)
MCH RBC QN AUTO: 27.5 PG (ref 26.5–34)
MCHC RBC AUTO-ENTMCNC: 33.2 G/DL (ref 31.4–36)
MCV RBC AUTO: 82.6 FL (ref 80–98)
MONOCYTES # BLD AUTO: 0.35 10*3/MM3 (ref 0–0.9)
MONOCYTES NFR BLD AUTO: 5.5 % (ref 0–12)
NEUTROPHILS # BLD AUTO: 3.77 10*3/MM3 (ref 2–8.6)
NEUTROPHILS NFR BLD AUTO: 59 % (ref 37–80)
PLATELET # BLD AUTO: 346 10*3/MM3 (ref 150–450)
PMV BLD AUTO: 10.3 FL (ref 8–12)
POTASSIUM BLD-SCNC: 4.2 MMOL/L (ref 3.5–5.1)
PROT SERPL-MCNC: 7.4 G/DL (ref 6.3–8.6)
RBC # BLD AUTO: 4.55 10*6/MM3 (ref 3.77–5.16)
SODIUM BLD-SCNC: 141 MMOL/L (ref 137–145)
TRIGL SERPL-MCNC: 139 MG/DL (ref 20–199)
TSH SERPL DL<=0.05 MIU/L-ACNC: 1.35 MIU/ML (ref 0.46–4.68)
WBC NRBC COR # BLD: 6.37 10*3/MM3 (ref 3.2–9.8)

## 2018-06-15 PROCEDURE — 80053 COMPREHEN METABOLIC PANEL: CPT

## 2018-06-15 PROCEDURE — 84443 ASSAY THYROID STIM HORMONE: CPT

## 2018-06-15 PROCEDURE — 83525 ASSAY OF INSULIN: CPT

## 2018-06-15 PROCEDURE — 80061 LIPID PANEL: CPT

## 2018-06-15 PROCEDURE — 83036 HEMOGLOBIN GLYCOSYLATED A1C: CPT

## 2018-06-15 PROCEDURE — 83527 ASSAY OF INSULIN: CPT

## 2018-06-15 PROCEDURE — 85025 COMPLETE CBC W/AUTO DIFF WBC: CPT

## 2018-06-19 ENCOUNTER — RESULTS ENCOUNTER (OUTPATIENT)
Dept: OBSTETRICS AND GYNECOLOGY | Facility: CLINIC | Age: 33
End: 2018-06-19

## 2018-06-19 ENCOUNTER — OFFICE VISIT (OUTPATIENT)
Dept: OBSTETRICS AND GYNECOLOGY | Facility: CLINIC | Age: 33
End: 2018-06-19

## 2018-06-19 ENCOUNTER — APPOINTMENT (OUTPATIENT)
Dept: LAB | Facility: HOSPITAL | Age: 33
End: 2018-06-19

## 2018-06-19 VITALS
WEIGHT: 260 LBS | BODY MASS INDEX: 38.51 KG/M2 | HEIGHT: 69 IN | HEART RATE: 99 BPM | SYSTOLIC BLOOD PRESSURE: 139 MMHG | DIASTOLIC BLOOD PRESSURE: 97 MMHG

## 2018-06-19 DIAGNOSIS — L67.8 ABNORMAL HAIR PATTERN: Primary | ICD-10-CM

## 2018-06-19 DIAGNOSIS — N89.8 VAGINAL DISCHARGE: ICD-10-CM

## 2018-06-19 DIAGNOSIS — R30.0 DYSURIA: ICD-10-CM

## 2018-06-19 LAB
BACTERIA UR QL AUTO: ABNORMAL /HPF
BILIRUB UR QL STRIP: NEGATIVE
CLARITY UR: CLEAR
COLOR UR: YELLOW
GLUCOSE UR STRIP-MCNC: NEGATIVE MG/DL
HGB UR QL STRIP.AUTO: ABNORMAL
HYALINE CASTS UR QL AUTO: ABNORMAL /LPF
KETONES UR QL STRIP: NEGATIVE
LEUKOCYTE ESTERASE UR QL STRIP.AUTO: ABNORMAL
NITRITE UR QL STRIP: NEGATIVE
PH UR STRIP.AUTO: 6.5 [PH] (ref 5–9)
PROT UR QL STRIP: NEGATIVE
RBC # UR: ABNORMAL /HPF
REF LAB TEST METHOD: ABNORMAL
SP GR UR STRIP: 1 (ref 1–1.03)
SQUAMOUS #/AREA URNS HPF: ABNORMAL /HPF
UROBILINOGEN UR QL STRIP: ABNORMAL
WBC UR QL AUTO: ABNORMAL /HPF

## 2018-06-19 PROCEDURE — 82627 DEHYDROEPIANDROSTERONE: CPT | Performed by: NURSE PRACTITIONER

## 2018-06-19 PROCEDURE — 81001 URINALYSIS AUTO W/SCOPE: CPT | Performed by: NURSE PRACTITIONER

## 2018-06-19 PROCEDURE — 36415 COLL VENOUS BLD VENIPUNCTURE: CPT | Performed by: NURSE PRACTITIONER

## 2018-06-19 PROCEDURE — 87086 URINE CULTURE/COLONY COUNT: CPT | Performed by: NURSE PRACTITIONER

## 2018-06-19 PROCEDURE — 84270 ASSAY OF SEX HORMONE GLOBUL: CPT | Performed by: NURSE PRACTITIONER

## 2018-06-19 PROCEDURE — 87077 CULTURE AEROBIC IDENTIFY: CPT | Performed by: NURSE PRACTITIONER

## 2018-06-19 PROCEDURE — 87186 SC STD MICRODIL/AGAR DIL: CPT | Performed by: NURSE PRACTITIONER

## 2018-06-19 PROCEDURE — 84402 ASSAY OF FREE TESTOSTERONE: CPT | Performed by: NURSE PRACTITIONER

## 2018-06-19 PROCEDURE — 99213 OFFICE O/P EST LOW 20 MIN: CPT | Performed by: NURSE PRACTITIONER

## 2018-06-19 PROCEDURE — 84403 ASSAY OF TOTAL TESTOSTERONE: CPT | Performed by: NURSE PRACTITIONER

## 2018-06-20 LAB
DHEA-S SERPL-MCNC: 108.9 UG/DL (ref 84.8–378)
SHBG SERPL-SCNC: 100.1 NMOL/L (ref 24.6–122)

## 2018-06-20 NOTE — PROGRESS NOTES
Subjective   Chief Complaint   Patient presents with   • Urinary Tract Infection     Iris Alan is a 33 y.o. year old No obstetric history on file. presenting to be seen for evaluation of an abnormal vaginal discharge. The discharge is watery and white.  Her symptoms have been present for 3 week(s).  Additional she has noticed odor and UTI symptoms (dysuria and urinary frequency).    She is sexually active.  In the past 12 months there has not been new sexual partners.  Condoms are not typically used.  She would like to be screened for STD's at today's exam.     Prior to the onset of symptoms she was not on systemic antibiotics.  She has not recently changed soaps/detergents/toilet tissue.  Prior to this visit, she has used Macrobid and flagyl in an attempt to improve her symptoms.    No LMP recorded (lmp unknown). Patient has had a hysterectomy.    Current birth control method: status post hysterectomy.    No Additional Complaints Reported    The following portions of the patient's history were reviewed and updated as appropriate:problem list, current medications and allergies    Review of Systems   Constitutional: Negative for activity change, appetite change, fatigue and unexpected weight change.   Respiratory: Negative for chest tightness and shortness of breath.    Cardiovascular: Negative for chest pain, palpitations and leg swelling.   Gastrointestinal: Negative for abdominal distention, abdominal pain, blood in stool, constipation, diarrhea, nausea and vomiting.   Endocrine: Negative for cold intolerance, heat intolerance, polydipsia, polyphagia and polyuria.        Reports recent dark, thick hair growth on chest.   Genitourinary: Positive for dysuria, frequency and vaginal discharge. Negative for difficulty urinating, dyspareunia, enuresis, flank pain, genital sores, pelvic pain, urgency, vaginal bleeding and vaginal pain.   Musculoskeletal: Negative for gait problem and myalgias.   Skin:  "Negative for color change, pallor and rash.   Neurological: Negative for dizziness, weakness, light-headedness and headaches.   Hematological: Negative for adenopathy.   Psychiatric/Behavioral: Negative for agitation, confusion, dysphoric mood, self-injury and suicidal ideas. The patient is not nervous/anxious.          Objective   /97   Pulse 99   Ht 175.3 cm (69\")   Wt 118 kg (260 lb)   LMP  (LMP Unknown)   Breastfeeding? No   BMI 38.40 kg/m²     General:  well developed; well nourished  no acute distress  appears stated age  mildly distressed   Skin:  No suspicious lesions seen   Pelvis: Clinical staff was present for exam  External genitalia:  normal appearance of the external genitalia including Bartholin's and [place]'s glands.  :  urethral meatus normal;  Vaginal:  normal pink mucosa without prolapse or lesions. discharge present -  watery and white; One Swab obtained.  Cervix:  absent.  Uterus:  absent.  Adnexa:  non palpable bilaterally.     Lab Review   vag panel from 11/2016    Imaging   No data reviewed         Diagnoses and all orders for this visit:    Abnormal hair pattern  -     Testosterone, F Eqlib & T LC / MS  -     DHEA-Sulfate  -     Sex Horm Binding Globulin    Dysuria  -     Urinalysis With Microscopic - Urine, Clean Catch  -     Urine Culture - Urine, Urine, Clean Catch  -     OneSwab - Kit, Vagina; Future  -     Urinalysis - Urine, Clean Catch  -     Urinalysis, Microscopic Only - Urine, Clean Catch    Vaginal discharge  -     Cancel: Gardnerella vaginalis, Trichomonas vaginalis, Candida albicans, PCR - Swab, Vagina  -     OneSwab - Kit, Vagina; Future        No orders of the defined types were placed in this encounter.    Will call with results when available.     This note was electronically signed.    Sonia Polo, LEN  June 20, 2018  "

## 2018-06-21 ENCOUNTER — TELEPHONE (OUTPATIENT)
Dept: OBSTETRICS AND GYNECOLOGY | Facility: CLINIC | Age: 33
End: 2018-06-21

## 2018-06-21 LAB
BACTERIA SPEC AEROBE CULT: ABNORMAL
INSULIN FREE SERPL-ACNC: 13 UU/ML
INSULIN SERPL-ACNC: 13 UU/ML

## 2018-06-21 RX ORDER — AMOXICILLIN AND CLAVULANATE POTASSIUM 875; 125 MG/1; MG/1
1 TABLET, FILM COATED ORAL EVERY 12 HOURS SCHEDULED
Qty: 14 TABLET | Refills: 0 | Status: SHIPPED | OUTPATIENT
Start: 2018-06-21 | End: 2018-07-20

## 2018-06-21 RX ORDER — FLUCONAZOLE 150 MG/1
TABLET ORAL
Qty: 2 TABLET | Refills: 0 | Status: SHIPPED | OUTPATIENT
Start: 2018-06-21 | End: 2018-06-28 | Stop reason: HOSPADM

## 2018-06-21 NOTE — TELEPHONE ENCOUNTER
----- Message from LEN Santiago sent at 6/21/2018 11:15 AM CDT -----  Please send augmentin 875/125mg BID x7 days and diflucan 150mg today and repeat in 4 days. One Swab and testosterone results not back yet.

## 2018-06-22 LAB
TESTOST FREE MFR SERPL: 2.4 % (ref 0.5–2.8)
TESTOST FREE SERPL-MCNC: 0.42 NG/DL (ref 0.1–0.85)
TESTOST SERPL-MCNC: 17.3 NG/DL (ref 10–55)

## 2018-06-25 ENCOUNTER — TELEPHONE (OUTPATIENT)
Dept: FAMILY MEDICINE CLINIC | Facility: CLINIC | Age: 33
End: 2018-06-25

## 2018-06-25 NOTE — TELEPHONE ENCOUNTER
Per LEN Tate, Iris Alan was called and given recent lab results. Continue current meds and follow up as planned or sooner if any problems.

## 2018-06-28 ENCOUNTER — TELEPHONE (OUTPATIENT)
Dept: OBSTETRICS AND GYNECOLOGY | Facility: CLINIC | Age: 33
End: 2018-06-28

## 2018-06-28 ENCOUNTER — OFFICE VISIT (OUTPATIENT)
Dept: FAMILY MEDICINE CLINIC | Facility: CLINIC | Age: 33
End: 2018-06-28

## 2018-06-28 VITALS
TEMPERATURE: 98.8 F | WEIGHT: 263.3 LBS | HEART RATE: 90 BPM | BODY MASS INDEX: 39 KG/M2 | RESPIRATION RATE: 20 BRPM | DIASTOLIC BLOOD PRESSURE: 82 MMHG | SYSTOLIC BLOOD PRESSURE: 120 MMHG | HEIGHT: 69 IN | OXYGEN SATURATION: 99 %

## 2018-06-28 DIAGNOSIS — E66.09 CLASS 2 OBESITY DUE TO EXCESS CALORIES WITHOUT SERIOUS COMORBIDITY WITH BODY MASS INDEX (BMI) OF 38.0 TO 38.9 IN ADULT: Primary | ICD-10-CM

## 2018-06-28 PROCEDURE — 99213 OFFICE O/P EST LOW 20 MIN: CPT | Performed by: NURSE PRACTITIONER

## 2018-06-28 RX ORDER — PEN NEEDLE, DIABETIC 30 GX3/16"
1 NEEDLE, DISPOSABLE MISCELLANEOUS DAILY
Qty: 30 EACH | Refills: 5 | Status: SHIPPED | OUTPATIENT
Start: 2018-06-28 | End: 2018-09-26

## 2018-06-28 NOTE — TELEPHONE ENCOUNTER
(Somehow I exited out of my documentation during the previous telephone encounter)    I spoke with the pt and informed her of + E Coli and that we are going to treat her with Augmentin and for her to take a round of Diflucan after she finishes her ABX.   I sent over her ex to the pharmacy per Sonia Polo.

## 2018-06-29 RX ORDER — FLUCONAZOLE 150 MG/1
TABLET ORAL
Qty: 2 TABLET | Refills: 0 | Status: SHIPPED | OUTPATIENT
Start: 2018-06-29 | End: 2018-07-20

## 2018-06-29 RX ORDER — AMOXICILLIN AND CLAVULANATE POTASSIUM 875; 125 MG/1; MG/1
1 TABLET, FILM COATED ORAL EVERY 12 HOURS SCHEDULED
Qty: 14 TABLET | Refills: 0 | Status: SHIPPED | OUTPATIENT
Start: 2018-06-29 | End: 2018-07-06

## 2018-07-20 PROCEDURE — 87660 TRICHOMONAS VAGIN DIR PROBE: CPT | Performed by: NURSE PRACTITIONER

## 2018-07-20 PROCEDURE — 87077 CULTURE AEROBIC IDENTIFY: CPT | Performed by: NURSE PRACTITIONER

## 2018-07-20 PROCEDURE — 87480 CANDIDA DNA DIR PROBE: CPT | Performed by: NURSE PRACTITIONER

## 2018-07-20 PROCEDURE — 87186 SC STD MICRODIL/AGAR DIL: CPT | Performed by: NURSE PRACTITIONER

## 2018-07-20 PROCEDURE — 87086 URINE CULTURE/COLONY COUNT: CPT | Performed by: NURSE PRACTITIONER

## 2018-07-20 PROCEDURE — 87510 GARDNER VAG DNA DIR PROBE: CPT | Performed by: NURSE PRACTITIONER

## 2018-07-22 ENCOUNTER — TELEPHONE (OUTPATIENT)
Dept: URGENT CARE | Facility: CLINIC | Age: 33
End: 2018-07-22

## 2018-07-30 ENCOUNTER — TELEPHONE (OUTPATIENT)
Dept: FAMILY MEDICINE CLINIC | Facility: CLINIC | Age: 33
End: 2018-07-30

## 2018-08-14 ENCOUNTER — OFFICE VISIT (OUTPATIENT)
Dept: FAMILY MEDICINE CLINIC | Facility: CLINIC | Age: 33
End: 2018-08-14

## 2018-08-14 ENCOUNTER — PRIOR AUTHORIZATION (OUTPATIENT)
Dept: FAMILY MEDICINE CLINIC | Facility: CLINIC | Age: 33
End: 2018-08-14

## 2018-08-14 VITALS
WEIGHT: 259.4 LBS | DIASTOLIC BLOOD PRESSURE: 84 MMHG | SYSTOLIC BLOOD PRESSURE: 116 MMHG | BODY MASS INDEX: 38.42 KG/M2 | HEIGHT: 69 IN

## 2018-08-14 DIAGNOSIS — R39.15 URINARY URGENCY: ICD-10-CM

## 2018-08-14 DIAGNOSIS — E66.9 OBESITY (BMI 35.0-39.9 WITHOUT COMORBIDITY): Primary | ICD-10-CM

## 2018-08-14 DIAGNOSIS — N30.01 ACUTE CYSTITIS WITH HEMATURIA: ICD-10-CM

## 2018-08-14 LAB
BILIRUB BLD-MCNC: NEGATIVE MG/DL
CLARITY, POC: CLEAR
COLOR UR: YELLOW
GLUCOSE UR STRIP-MCNC: NEGATIVE MG/DL
KETONES UR QL: NEGATIVE
LEUKOCYTE EST, POC: ABNORMAL
NITRITE UR-MCNC: NEGATIVE MG/ML
PH UR: 7 [PH] (ref 5–8)
PROT UR STRIP-MCNC: ABNORMAL MG/DL
RBC # UR STRIP: ABNORMAL /UL
SP GR UR: 1.01 (ref 1–1.03)
UROBILINOGEN UR QL: NORMAL

## 2018-08-14 PROCEDURE — 99214 OFFICE O/P EST MOD 30 MIN: CPT | Performed by: NURSE PRACTITIONER

## 2018-08-14 RX ORDER — LEVOFLOXACIN 500 MG/1
500 TABLET, FILM COATED ORAL DAILY
Qty: 7 TABLET | Refills: 0 | Status: SHIPPED | OUTPATIENT
Start: 2018-08-14 | End: 2018-09-26

## 2018-08-14 NOTE — PROGRESS NOTES
"Subjective   Iris Casandra Alan is a 33 y.o. female.  One month weight check.  Was placed on Saxenda as previously stated and tolerated to sample really well.  Is having some issues with her insurance covering the medication.  Also reports she has history of frequent urinary tract infections and has seen Dr. Wilcox in the past.  Was recently treated for urinary tract infection at urgent care and \"I don't feel like the medicine they gave me to care of it.\"  Continues to complain of urinary urgency    Obesity   This is a chronic problem. The current episode started more than 1 year ago. The problem occurs constantly. The problem has been gradually improving. Pertinent negatives include no chills, diaphoresis, fatigue or fever. The symptoms are aggravated by drinking and eating. She has tried walking (Saxenda) for the symptoms. The treatment provided mild relief.   Urinary Tract Infection    This is a recurrent problem. The current episode started 1 to 4 weeks ago. The problem occurs every urination. The problem has been waxing and waning. The quality of the pain is described as burning. The pain is at a severity of 4/10. The pain is mild. There has been no fever. She is sexually active. There is a history of pyelonephritis. Associated symptoms include urgency. Pertinent negatives include no chills. She has tried antibiotics and increased fluids for the symptoms. The treatment provided no relief. Her past medical history is significant for recurrent UTIs.        The following portions of the patient's history were reviewed and updated as appropriate: past medical history, past surgical history and problem list.    Review of Systems   Constitutional: Negative.  Negative for chills, diaphoresis, fatigue and fever.   HENT: Negative.    Eyes: Negative.    Respiratory: Negative.    Cardiovascular: Negative.    Gastrointestinal: Negative.    Genitourinary: Positive for urgency.   Musculoskeletal: Negative.    Skin: " Negative.    Neurological: Negative.    Psychiatric/Behavioral: Negative.  Negative for confusion.       Objective   Physical Exam   Constitutional: She is oriented to person, place, and time. She appears well-developed and well-nourished.   HENT:   Head: Normocephalic.   Right Ear: External ear normal.   Left Ear: External ear normal.   Eyes: Pupils are equal, round, and reactive to light. EOM are normal.   Neck: Normal range of motion. Neck supple.   Cardiovascular: Normal rate, regular rhythm and normal heart sounds.    Pulmonary/Chest: Effort normal and breath sounds normal.   Abdominal: Soft. Bowel sounds are normal.   Musculoskeletal: Normal range of motion.   Neurological: She is alert and oriented to person, place, and time.   Skin: Skin is warm. Capillary refill takes less than 2 seconds.   Psychiatric: She has a normal mood and affect. Her behavior is normal.   Nursing note and vitals reviewed.      Assessment/Plan   Problems Addressed this Visit     None      Visit Diagnoses     Obesity (BMI 35.0-39.9 without comorbidity)    -  Primary    Urinary urgency        Relevant Orders    POCT urinalysis dipstick, manual (Completed)    Acute cystitis with hematuria        Relevant Medications    levoFLOXacin (LEVAQUIN) 500 MG tablet        1.  Obesity BMI of 35.0-39.9 without comorbidity:  Will attempt to obtain PA for Saxenda  Encouraged to continue on reduce calorie diet  Encouraged to continue with physical activity regimen of a minimum of 150 minutes per week    2.  Urinary urgency:  Urinalysis dipstick performed in office and resulted as moderate amount of fried, trace of protein and a trace of leukocytes    3.  Acute cystitis with hematuria:  Begin Levaquin 500 milligrams as prescribed be taken 1 by mouth daily ×7 days  Educated on increased photosensitivity while on this medication  Encouraged increase fluids to help promote hydration and elimination  Discussed possibility of referral to urology if no  improvement in symptoms    Continue on current medications as previously prescribed   Schedule follow-up appointment with this office in 1 month for weight recheck        This document has been electronically signed by LEN aRmos on August 14, 2018 10:08 AM

## 2018-09-06 ENCOUNTER — LAB (OUTPATIENT)
Dept: LAB | Facility: HOSPITAL | Age: 33
End: 2018-09-06

## 2018-09-06 DIAGNOSIS — N39.0 URINARY TRACT INFECTION WITHOUT HEMATURIA, SITE UNSPECIFIED: Primary | ICD-10-CM

## 2018-09-06 DIAGNOSIS — N39.0 URINARY TRACT INFECTION WITHOUT HEMATURIA, SITE UNSPECIFIED: ICD-10-CM

## 2018-09-06 LAB
BACTERIA UR QL AUTO: ABNORMAL /HPF
BILIRUB UR QL STRIP: NEGATIVE
CLARITY UR: CLEAR
COLOR UR: YELLOW
GLUCOSE UR STRIP-MCNC: NEGATIVE MG/DL
HGB UR QL STRIP.AUTO: NEGATIVE
HYALINE CASTS UR QL AUTO: ABNORMAL /LPF
KETONES UR QL STRIP: NEGATIVE
LEUKOCYTE ESTERASE UR QL STRIP.AUTO: NEGATIVE
NITRITE UR QL STRIP: NEGATIVE
PH UR STRIP.AUTO: 5.5 [PH] (ref 5–9)
PROT UR QL STRIP: NEGATIVE
RBC # UR: ABNORMAL /HPF
REF LAB TEST METHOD: ABNORMAL
SP GR UR STRIP: 1.02 (ref 1–1.03)
SQUAMOUS #/AREA URNS HPF: ABNORMAL /HPF
UROBILINOGEN UR QL STRIP: NORMAL
WBC UR QL AUTO: ABNORMAL /HPF

## 2018-09-06 PROCEDURE — 81001 URINALYSIS AUTO W/SCOPE: CPT

## 2018-09-10 ENCOUNTER — TELEPHONE (OUTPATIENT)
Dept: FAMILY MEDICINE CLINIC | Facility: CLINIC | Age: 33
End: 2018-09-10

## 2018-09-10 NOTE — TELEPHONE ENCOUNTER
Per LEN Otero, Ms. Alan has been called with her recent lab results & recommendations.  Continue her current medications and follow-up as planned or sooner if any problems.      ----- Message from LEN Ramos sent at 9/8/2018 12:53 PM CDT -----  Labs normal, please call or send card!

## 2018-09-10 NOTE — PROGRESS NOTES
Per LEN Otero, Ms. Alan has been called with her recent lab results & recommendations.  Continue her current medications and follow-up as planned or sooner if any problems.

## 2018-09-28 ENCOUNTER — HOSPITAL ENCOUNTER (OUTPATIENT)
Dept: CT IMAGING | Facility: HOSPITAL | Age: 33
Discharge: HOME | End: 2018-09-28
Admitting: FAMILY MEDICINE

## 2018-09-28 DIAGNOSIS — R10.32 ABDOMINAL PAIN, LEFT LOWER QUADRANT: ICD-10-CM

## 2018-09-28 PROCEDURE — 74176 CT ABD & PELVIS W/O CONTRAST: CPT

## 2018-10-04 ENCOUNTER — OFFICE VISIT (OUTPATIENT)
Dept: FAMILY MEDICINE CLINIC | Facility: CLINIC | Age: 33
End: 2018-10-04

## 2018-10-04 ENCOUNTER — APPOINTMENT (OUTPATIENT)
Dept: LAB | Facility: HOSPITAL | Age: 33
End: 2018-10-04

## 2018-10-04 VITALS
HEIGHT: 69 IN | WEIGHT: 247 LBS | SYSTOLIC BLOOD PRESSURE: 108 MMHG | TEMPERATURE: 98.6 F | BODY MASS INDEX: 36.58 KG/M2 | DIASTOLIC BLOOD PRESSURE: 72 MMHG

## 2018-10-04 DIAGNOSIS — N89.8 VAGINAL DISCHARGE: ICD-10-CM

## 2018-10-04 DIAGNOSIS — N30.01 ACUTE CYSTITIS WITH HEMATURIA: Primary | ICD-10-CM

## 2018-10-04 LAB
BILIRUB BLD-MCNC: NEGATIVE MG/DL
CANDIDA ALBICANS: NEGATIVE
CLARITY, POC: CLEAR
COLOR UR: YELLOW
GARDNERELLA VAGINALIS: NEGATIVE
GLUCOSE UR STRIP-MCNC: NEGATIVE MG/DL
KETONES UR QL: NEGATIVE
LEUKOCYTE EST, POC: ABNORMAL
NITRITE UR-MCNC: NEGATIVE MG/ML
PH UR: 9 [PH] (ref 5–8)
PROT UR STRIP-MCNC: ABNORMAL MG/DL
RBC # UR STRIP: ABNORMAL /UL
SP GR UR: 1 (ref 1–1.03)
TRICHOMONAS VAGINALIS PCR: NEGATIVE
UROBILINOGEN UR QL: NORMAL

## 2018-10-04 PROCEDURE — 87480 CANDIDA DNA DIR PROBE: CPT | Performed by: NURSE PRACTITIONER

## 2018-10-04 PROCEDURE — 87660 TRICHOMONAS VAGIN DIR PROBE: CPT | Performed by: NURSE PRACTITIONER

## 2018-10-04 PROCEDURE — 87510 GARDNER VAG DNA DIR PROBE: CPT | Performed by: NURSE PRACTITIONER

## 2018-10-04 PROCEDURE — 99213 OFFICE O/P EST LOW 20 MIN: CPT | Performed by: NURSE PRACTITIONER

## 2018-10-04 PROCEDURE — 87086 URINE CULTURE/COLONY COUNT: CPT | Performed by: NURSE PRACTITIONER

## 2018-10-04 RX ORDER — HYDROCODONE BITARTRATE AND ACETAMINOPHEN 7.5; 325 MG/1; MG/1
TABLET ORAL
Refills: 0 | COMMUNITY
Start: 2018-09-28 | End: 2018-12-14

## 2018-10-04 RX ORDER — TAMSULOSIN HYDROCHLORIDE 0.4 MG/1
1 CAPSULE ORAL DAILY
Refills: 1 | COMMUNITY
Start: 2018-09-28 | End: 2018-10-04

## 2018-10-04 RX ORDER — CIPROFLOXACIN 500 MG/1
500 TABLET, FILM COATED ORAL 2 TIMES DAILY
Refills: 0 | COMMUNITY
Start: 2018-09-28 | End: 2018-10-04

## 2018-10-04 NOTE — PROGRESS NOTES
"Subjective   Iris Casandra Alan is a 33 y.o. female.  2 weeks ago began complaining of left flank pain with nausea/vomiting and diarrhea.  \"I was getting her hair done sitting in the chair and all of a sudden had this severe pain in the left side.  I cannot really nauseous and had to vomit.\"  Was seen at urgent care on 9/26/2018 and diagnosed with viral gastroenteritis.  Had negative CAT scan at that time.  Continued to complain of left flank pain but 2 days ago noticed that she had 2 small stones on the toilet paper when she was wiping.  Yesterday continued to have flank pain all day but today feels some better.  Continues to complain of dysuria.  Was on Cipro for urinary tract infection that was diagnosed when she was at the urgent care and believe she may now have developed a yeast infection.  Complains of white vaginal discharge and discomfort.     Flank Pain   This is a new problem. The current episode started 1 to 4 weeks ago. The problem occurs constantly. The problem has been gradually improving since onset. The pain is present in the lumbar spine. The quality of the pain is described as aching. The pain is at a severity of 6/10. The pain is moderate. Pertinent negatives include no bladder incontinence or fever. Treatments tried: Lortab and Cipro  The treatment provided moderate relief.   Vaginal Discharge   The patient's primary symptoms include vaginal discharge. This is a new problem. The current episode started in the past 7 days. The problem occurs constantly. The problem has been unchanged. The patient is experiencing no pain. Associated symptoms include flank pain. Pertinent negatives include no chills or fever. The vaginal discharge was white and thick. There has been no bleeding. She has tried nothing for the symptoms. She is sexually active. No, her partner does not have an STD.        The following portions of the patient's history were reviewed and updated as appropriate: allergies, current " medications, past surgical history and problem list.     Review of Systems   Constitutional: Negative.  Negative for chills, diaphoresis, fatigue and fever.   Respiratory: Negative.    Cardiovascular: Negative.    Gastrointestinal: Negative.    Genitourinary: Positive for flank pain and vaginal discharge. Negative for bladder incontinence.   Skin: Negative.    Neurological: Negative.    Psychiatric/Behavioral: Negative.  Negative for confusion.       Objective   Physical Exam   Constitutional: She is oriented to person, place, and time. She appears well-developed and well-nourished.   HENT:   Head: Normocephalic.   Eyes: Pupils are equal, round, and reactive to light.   Cardiovascular: Normal rate, regular rhythm and normal heart sounds.    Pulmonary/Chest: Effort normal and breath sounds normal.   Abdominal: Soft. Bowel sounds are normal. There is CVA tenderness.   Musculoskeletal: Normal range of motion.   Neurological: She is alert and oriented to person, place, and time.   Skin: Skin is warm.   Psychiatric: She has a normal mood and affect. Her behavior is normal.   Nursing note and vitals reviewed.      Assessment/Plan   Problems Addressed this Visit     None      Visit Diagnoses     Acute cystitis with hematuria    -  Primary    Relevant Orders    Urine Culture - Urine, Urine, Clean Catch    Vaginal discharge        Relevant Orders    Urine Culture - Urine, Urine, Clean Catch    Gardnerella vaginalis, Trichomonas vaginalis, Candida albicans, PCR - Swab, Vagina        1.  Acute cystitis with hematuria:  Urinalysis dipstick performed in office and resulted as a trace of leukocytes, trace of protein, and a trace of blood  Specimen will be sent to lab for urine culture and will notify of results when available  Encouraged increase fluids to help promote hydration  Continue on Lortab as previously prescribed for flank pain    2.  Vaginal discharge:  Vaginosis panel collected and sent to lab and will notify of  results when available    Continue on current medications as previously prescribed   Schedule follow-up appointment with this office in 48-72 hours if no improvement or worsening of symptoms      This document has been electronically signed by LEN Ramos on October 4, 2018 9:24 AM

## 2018-10-05 LAB — BACTERIA SPEC AEROBE CULT: NORMAL

## 2018-11-21 ENCOUNTER — APPOINTMENT (OUTPATIENT)
Dept: LAB | Facility: HOSPITAL | Age: 33
End: 2018-11-21

## 2018-11-21 DIAGNOSIS — R30.0 DYSURIA: Primary | ICD-10-CM

## 2018-11-21 DIAGNOSIS — M54.5 ACUTE LOW BACK PAIN, UNSPECIFIED BACK PAIN LATERALITY, WITH SCIATICA PRESENCE UNSPECIFIED: Primary | ICD-10-CM

## 2018-11-21 DIAGNOSIS — R31.9 HEMATURIA, UNSPECIFIED TYPE: ICD-10-CM

## 2018-11-21 LAB
BACTERIA UR QL AUTO: ABNORMAL /HPF
BILIRUB UR QL STRIP: NEGATIVE
CLARITY UR: ABNORMAL
COLOR UR: YELLOW
GLUCOSE UR STRIP-MCNC: NEGATIVE MG/DL
HGB UR QL STRIP.AUTO: ABNORMAL
HYALINE CASTS UR QL AUTO: ABNORMAL /LPF
KETONES UR QL STRIP: NEGATIVE
LEUKOCYTE ESTERASE UR QL STRIP.AUTO: NEGATIVE
NITRITE UR QL STRIP: NEGATIVE
PH UR STRIP.AUTO: 6 [PH] (ref 5–9)
PROT UR QL STRIP: NEGATIVE
RBC # UR: ABNORMAL /HPF
REF LAB TEST METHOD: ABNORMAL
SP GR UR STRIP: 1.02 (ref 1–1.03)
SQUAMOUS #/AREA URNS HPF: ABNORMAL /HPF
UROBILINOGEN UR QL STRIP: ABNORMAL
WBC UR QL AUTO: ABNORMAL /HPF

## 2018-11-21 PROCEDURE — 87086 URINE CULTURE/COLONY COUNT: CPT | Performed by: NURSE PRACTITIONER

## 2018-11-21 PROCEDURE — 81001 URINALYSIS AUTO W/SCOPE: CPT | Performed by: NURSE PRACTITIONER

## 2018-11-21 RX ORDER — PHENAZOPYRIDINE HYDROCHLORIDE 200 MG/1
200 TABLET, FILM COATED ORAL 3 TIMES DAILY PRN
Qty: 12 TABLET | Refills: 0 | Status: SHIPPED | OUTPATIENT
Start: 2018-11-21 | End: 2018-12-14

## 2018-11-21 RX ORDER — NITROFURANTOIN 25; 75 MG/1; MG/1
100 CAPSULE ORAL 2 TIMES DAILY
Qty: 14 CAPSULE | Refills: 0 | Status: SHIPPED | OUTPATIENT
Start: 2018-11-21 | End: 2018-12-14

## 2018-11-23 LAB — BACTERIA SPEC AEROBE CULT: NORMAL

## 2018-12-14 PROCEDURE — 87086 URINE CULTURE/COLONY COUNT: CPT | Performed by: NURSE PRACTITIONER

## 2018-12-14 PROCEDURE — 87510 GARDNER VAG DNA DIR PROBE: CPT | Performed by: NURSE PRACTITIONER

## 2018-12-14 PROCEDURE — 87660 TRICHOMONAS VAGIN DIR PROBE: CPT | Performed by: NURSE PRACTITIONER

## 2018-12-14 PROCEDURE — 87480 CANDIDA DNA DIR PROBE: CPT | Performed by: NURSE PRACTITIONER

## 2018-12-17 ENCOUNTER — APPOINTMENT (OUTPATIENT)
Dept: LAB | Facility: HOSPITAL | Age: 33
End: 2018-12-17

## 2018-12-17 PROCEDURE — 87591 N.GONORRHOEAE DNA AMP PROB: CPT | Performed by: NURSE PRACTITIONER

## 2018-12-17 PROCEDURE — 87491 CHLMYD TRACH DNA AMP PROBE: CPT | Performed by: NURSE PRACTITIONER

## 2018-12-17 PROCEDURE — 87661 TRICHOMONAS VAGINALIS AMPLIF: CPT | Performed by: NURSE PRACTITIONER

## 2019-01-28 ENCOUNTER — OFFICE VISIT (OUTPATIENT)
Dept: PODIATRY | Facility: CLINIC | Age: 34
End: 2019-01-28

## 2019-01-28 VITALS
DIASTOLIC BLOOD PRESSURE: 88 MMHG | OXYGEN SATURATION: 98 % | BODY MASS INDEX: 35.4 KG/M2 | HEART RATE: 115 BPM | WEIGHT: 239 LBS | HEIGHT: 69 IN | SYSTOLIC BLOOD PRESSURE: 136 MMHG

## 2019-01-28 DIAGNOSIS — R52 PAIN: ICD-10-CM

## 2019-01-28 DIAGNOSIS — M20.11 HALLUX VALGUS OF RIGHT FOOT: Primary | ICD-10-CM

## 2019-01-28 PROCEDURE — 99203 OFFICE O/P NEW LOW 30 MIN: CPT | Performed by: PODIATRIST

## 2019-01-28 RX ORDER — MELOXICAM 15 MG/1
15 TABLET ORAL DAILY
Qty: 30 TABLET | Refills: 0 | Status: SHIPPED | OUTPATIENT
Start: 2019-01-28 | End: 2019-02-26 | Stop reason: SDUPTHER

## 2019-01-28 NOTE — PROGRESS NOTES
Iris Alan  1985  33 y.o. female     Patient presents to clinic today with complaint of right foot pain.    2019  Chief Complaint   Patient presents with   • Right Foot - Pain           History of Present Illness    Iris Alan is a 33 y.o. female who presents for evaluation of right foot pain.  She states the pain is located along the inside border of her big toe.  She states that she has had a bunion deformity for several years.  The deformity did bother her significantly until the past 4-6 weeks.  She states there is now associated achy and throbbing pain.  The pain is worse with prolonged weightbearing.  She states she has been walking on the treadmill more recently and it typically hurts her within 10 minutes of walking.  She is tried a bunion splint as well as oral ibuprofen with minimal relief of her symptoms.  She denies any other previous treatments.  She denies any prior trauma or injuries to the area.      Past Medical History:   Diagnosis Date   • Abnormal weight gain    • Acquired keratosis pilaris    • Adult BMI > 30    • Bunion    • Dysuria    • KATHY (generalized anxiety disorder)    • Headache    • Low back pain    • Need for immunization against influenza    • Rash    • Recurrent major depression (CMS/HCC)    • Spasm of back muscles    • Trochanteric tendinitis     bursitis   • Urinary tract infection          Past Surgical History:   Procedure Laterality Date   •  SECTION      x 3   • CHOLECYSTECTOMY WITH INTRAOPERATIVE CHOLANGIOGRAM N/A 2017    Procedure: LAPAROSCOPIC CHOLECYSTECTOMY WITH CHOLANGIOGRAM  (C-ARM#2);  Surgeon: Luis Cummins MD;  Location: [facility];  Service:    • HYSTEROSCOPY  2009   • INJECTION OF MEDICATION  2016    kenalog   • INJECTION OF MEDICATION  2016    toradol   • INJECTION OF MEDICATION  2016    zofran   • LAPAROSCOPY DIAGNOSTIC / BIOPSY / ASPIRATION / LYSIS  2013   • PAP SMEAR   "2012   • SALPINGO OOPHORECTOMY  2013   • SALPINGO OOPHORECTOMY Left     right ovary intact   • TOTAL ABDOMINAL HYSTERECTOMY  2014         Family History   Problem Relation Age of Onset   • Depression Mother    • Diabetes Mother    • Breast cancer Maternal Grandmother    • Coronary artery disease Maternal Grandmother    • Diabetes Maternal Grandmother    • Hypertension Maternal Grandmother    • Breast cancer Other    • Cancer Paternal Grandmother    • Hypertension Paternal Grandfather          Social History     Socioeconomic History   • Marital status: Single     Spouse name: Not on file   • Number of children: Not on file   • Years of education: Not on file   • Highest education level: Not on file   Social Needs   • Financial resource strain: Not on file   • Food insecurity - worry: Not on file   • Food insecurity - inability: Not on file   • Transportation needs - medical: Not on file   • Transportation needs - non-medical: Not on file   Occupational History   • Not on file   Tobacco Use   • Smoking status: Former Smoker     Types: Cigarettes     Last attempt to quit: 2012     Years since quittin.0   • Smokeless tobacco: Never Used   Substance and Sexual Activity   • Alcohol use: No   • Drug use: No   • Sexual activity: Yes     Partners: Male     Birth control/protection: Surgical   Other Topics Concern   • Not on file   Social History Narrative   • Not on file         Current Outpatient Medications   Medication Sig Dispense Refill   • meloxicam (MOBIC) 15 MG tablet Take 1 tablet by mouth Daily. Take once daily. 30 tablet 0     No current facility-administered medications for this visit.          OBJECTIVE    /88   Pulse 115   Ht 175.3 cm (69\")   Wt 108 kg (239 lb)   LMP  (LMP Unknown)   SpO2 98%   BMI 35.29 kg/m²       Review of Systems   Constitutional: Negative.    HENT: Negative.    Eyes: Negative.    Respiratory: Negative.    Cardiovascular: Negative.    Gastrointestinal: " Negative.    Endocrine: Negative.    Genitourinary: Negative.    Musculoskeletal:        Foot pain   Skin: Negative.    Allergic/Immunologic: Negative.    Neurological: Negative.    Hematological: Negative.    Psychiatric/Behavioral: Negative.          Physical Exam   Constitutional: she appears well-developed and well-nourished.   HEENT: Normocephalic. Atraumatic  CV: No CP. RRR  Resp: Non-labored respirations  Psychiatric: she has a normal mood and affect. her behavior is normal.         Lower Extremity Exam:  Vascular: DP/PT pulses palpable 2+.   Minimal right foot edema  Foot warm  CFT wnl  Neuro: Protective sensation intact, b/l.  DTRs intact  Integument: No open wounds or lesions.  No  scaling  Skin quality normal  Mild erythema over 1st MTPJ  Musculoskeletal: LE muscle strength 5/5.   Gait normal  Ankle ROM decreased without pain or crepitus  STJ ROM full without pain or crepitus  1st MTPJ ROM wnl with mild tenderness. Moderate HAV  +Dorsomedial 1st mtpj eminence. Mild tenderness on palpation.  Flexible HT deformity 2-5              ASSESSMENT AND PLAN    Iris was seen today for pain.    Diagnoses and all orders for this visit:    Hallux valgus of right foot    Pain  -     XR Foot 3+ View Right    Other orders  -     meloxicam (MOBIC) 15 MG tablet; Take 1 tablet by mouth Daily. Take once daily.      -Comprehensive foot and ankle exam performed  -Radiographs reviewed  -Educated pt on diagnosis, etiology and treatment of hallux abducto valgus.  -Recommend soft, wide toe box accommodative shoe gear.  -Rx Meloxicam 15 mg qday, not to be taken with other NSAIDs.  -Advised use of bunion guards with activity  -Did discuss possible surgical correction in future if symptoms do not improve.  -Recheck 4 weeks, PRN          This document has been electronically signed by Hugo Lancaster DPM on January 28, 2019 11:49 AM     EMR Dragon/Transcription disclaimer:   Much of this encounter note is an electronic  transcription/translation of spoken language to printed text. The electronic translation of spoken language may permit erroneous, or at times, nonsensical words or phrases to be inadvertently transcribed; Although I have reviewed the note for such errors, some may still exist.    Hugo Lancaster DPM  1/28/2019  11:49 AM

## 2019-02-28 RX ORDER — MELOXICAM 15 MG/1
TABLET ORAL
Qty: 30 TABLET | Refills: 0 | Status: SHIPPED | OUTPATIENT
Start: 2019-02-28 | End: 2019-05-21

## 2019-04-10 DIAGNOSIS — R10.2 SUPRAPUBIC ABDOMINAL PAIN: Primary | ICD-10-CM

## 2019-04-11 ENCOUNTER — LAB (OUTPATIENT)
Dept: LAB | Facility: HOSPITAL | Age: 34
End: 2019-04-11

## 2019-04-11 DIAGNOSIS — R10.2 SUPRAPUBIC ABDOMINAL PAIN: ICD-10-CM

## 2019-04-11 LAB
CANDIDA ALBICANS: NEGATIVE
GARDNERELLA VAGINALIS: POSITIVE
TRICHOMONAS VAGINALIS PCR: NEGATIVE

## 2019-04-11 PROCEDURE — 87660 TRICHOMONAS VAGIN DIR PROBE: CPT

## 2019-04-11 PROCEDURE — 87480 CANDIDA DNA DIR PROBE: CPT

## 2019-04-11 PROCEDURE — 87510 GARDNER VAG DNA DIR PROBE: CPT

## 2019-04-11 PROCEDURE — 81003 URINALYSIS AUTO W/O SCOPE: CPT

## 2019-04-12 DIAGNOSIS — N76.0 BV (BACTERIAL VAGINOSIS): Primary | ICD-10-CM

## 2019-04-12 DIAGNOSIS — B96.89 BV (BACTERIAL VAGINOSIS): Primary | ICD-10-CM

## 2019-04-12 LAB
BILIRUB UR QL STRIP: NEGATIVE
CLARITY UR: ABNORMAL
COLOR UR: YELLOW
GLUCOSE UR STRIP-MCNC: NEGATIVE MG/DL
HGB UR QL STRIP.AUTO: NEGATIVE
KETONES UR QL STRIP: NEGATIVE
LEUKOCYTE ESTERASE UR QL STRIP.AUTO: NEGATIVE
NITRITE UR QL STRIP: NEGATIVE
PH UR STRIP.AUTO: 5.5 [PH] (ref 5–8)
PROT UR QL STRIP: NEGATIVE
SP GR UR STRIP: 1.03 (ref 1–1.03)
UROBILINOGEN UR QL STRIP: ABNORMAL

## 2019-04-12 RX ORDER — METRONIDAZOLE 500 MG/1
500 TABLET ORAL 2 TIMES DAILY
Qty: 14 TABLET | Refills: 0 | Status: SHIPPED | OUTPATIENT
Start: 2019-04-12 | End: 2019-05-21

## 2019-05-16 ENCOUNTER — APPOINTMENT (OUTPATIENT)
Dept: GENERAL RADIOLOGY | Facility: HOSPITAL | Age: 34
End: 2019-05-16

## 2019-05-16 ENCOUNTER — APPOINTMENT (OUTPATIENT)
Dept: CT IMAGING | Facility: HOSPITAL | Age: 34
End: 2019-05-16

## 2019-05-16 ENCOUNTER — HOSPITAL ENCOUNTER (EMERGENCY)
Facility: HOSPITAL | Age: 34
Discharge: HOME | End: 2019-05-17
Attending: FAMILY MEDICINE | Admitting: FAMILY MEDICINE

## 2019-05-16 VITALS
SYSTOLIC BLOOD PRESSURE: 121 MMHG | WEIGHT: 236.2 LBS | BODY MASS INDEX: 34.98 KG/M2 | TEMPERATURE: 98.2 F | OXYGEN SATURATION: 97 % | HEART RATE: 91 BPM | RESPIRATION RATE: 18 BRPM | HEIGHT: 69 IN | DIASTOLIC BLOOD PRESSURE: 73 MMHG

## 2019-05-16 DIAGNOSIS — S16.1XXA STRAIN OF NECK MUSCLE, INITIAL ENCOUNTER: Primary | ICD-10-CM
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
## 2019-05-16 DIAGNOSIS — S46.912A SHOULDER STRAIN, LEFT, INITIAL ENCOUNTER: ICD-10-CM

## 2019-05-16 PROCEDURE — 73030 X-RAY EXAM OF SHOULDER: CPT

## 2019-05-16 PROCEDURE — 72125 CT NECK SPINE W/O DYE: CPT

## 2019-05-16 PROCEDURE — 70450 CT HEAD/BRAIN W/O DYE: CPT

## 2019-05-16 PROCEDURE — 99284 EMERGENCY DEPT VISIT MOD MDM: CPT

## 2019-05-16 PROCEDURE — 72100 X-RAY EXAM L-S SPINE 2/3 VWS: CPT

## 2019-05-16 RX ORDER — HYDROCODONE BITARTRATE AND ACETAMINOPHEN 7.5; 325 MG/1; MG/1
1 TABLET ORAL ONCE
Status: COMPLETED | OUTPATIENT
Start: 2019-05-16 | End: 2019-05-16

## 2019-05-16 RX ADMIN — HYDROCODONE BITARTRATE AND ACETAMINOPHEN 1 TABLET: 7.5; 325 TABLET ORAL at 22:13

## 2019-05-17 PROCEDURE — 96372 THER/PROPH/DIAG INJ SC/IM: CPT

## 2019-05-17 PROCEDURE — 25010000002 KETOROLAC TROMETHAMINE PER 15 MG: Performed by: FAMILY MEDICINE

## 2019-05-17 RX ORDER — TRAMADOL HYDROCHLORIDE 50 MG/1
50 TABLET ORAL EVERY 6 HOURS PRN
Qty: 12 TABLET | Refills: 0 | Status: SHIPPED | OUTPATIENT
Start: 2019-05-17 | End: 2019-05-24

## 2019-05-17 RX ORDER — CYCLOBENZAPRINE HCL 10 MG
10 TABLET ORAL 3 TIMES DAILY PRN
Qty: 21 TABLET | Refills: 0 | Status: SHIPPED | OUTPATIENT
Start: 2019-05-17 | End: 2019-05-30 | Stop reason: ALTCHOICE

## 2019-05-17 RX ORDER — KETOROLAC TROMETHAMINE 30 MG/ML
60 INJECTION, SOLUTION INTRAMUSCULAR; INTRAVENOUS ONCE
Status: COMPLETED | OUTPATIENT
Start: 2019-05-17 | End: 2019-05-17

## 2019-05-17 RX ADMIN — KETOROLAC TROMETHAMINE 60 MG: 60 INJECTION, SOLUTION INTRAMUSCULAR at 01:14

## 2019-05-21 ENCOUNTER — OFFICE VISIT (OUTPATIENT)
Dept: PODIATRY | Facility: CLINIC | Age: 34
End: 2019-05-21

## 2019-05-21 ENCOUNTER — TELEPHONE (OUTPATIENT)
Dept: FAMILY MEDICINE CLINIC | Facility: CLINIC | Age: 34
End: 2019-05-21

## 2019-05-21 ENCOUNTER — OFFICE VISIT (OUTPATIENT)
Dept: FAMILY MEDICINE CLINIC | Facility: CLINIC | Age: 34
End: 2019-05-21

## 2019-05-21 ENCOUNTER — LAB (OUTPATIENT)
Dept: LAB | Facility: HOSPITAL | Age: 34
End: 2019-05-21

## 2019-05-21 VITALS
SYSTOLIC BLOOD PRESSURE: 118 MMHG | HEIGHT: 69 IN | BODY MASS INDEX: 35.04 KG/M2 | DIASTOLIC BLOOD PRESSURE: 92 MMHG | WEIGHT: 236.6 LBS

## 2019-05-21 VITALS — HEIGHT: 69 IN | WEIGHT: 236.6 LBS | BODY MASS INDEX: 35.04 KG/M2

## 2019-05-21 DIAGNOSIS — N89.8 VAGINAL ITCHING: Primary | ICD-10-CM

## 2019-05-21 DIAGNOSIS — M54.50 ACUTE LOW BACK PAIN WITHOUT SCIATICA, UNSPECIFIED BACK PAIN LATERALITY: ICD-10-CM

## 2019-05-21 DIAGNOSIS — R52 PAIN: ICD-10-CM

## 2019-05-21 DIAGNOSIS — M54.2 CERVICAL SPINE PAIN: ICD-10-CM

## 2019-05-21 DIAGNOSIS — N76.0 BV (BACTERIAL VAGINOSIS): Primary | ICD-10-CM

## 2019-05-21 DIAGNOSIS — N89.8 VAGINAL ITCHING: ICD-10-CM

## 2019-05-21 DIAGNOSIS — M20.11 HALLUX VALGUS OF RIGHT FOOT: Primary | ICD-10-CM

## 2019-05-21 DIAGNOSIS — B96.89 BV (BACTERIAL VAGINOSIS): Primary | ICD-10-CM

## 2019-05-21 LAB
CANDIDA ALBICANS: NEGATIVE
GARDNERELLA VAGINALIS: POSITIVE
TRICHOMONAS VAGINALIS PCR: NEGATIVE

## 2019-05-21 PROCEDURE — 99214 OFFICE O/P EST MOD 30 MIN: CPT | Performed by: PODIATRIST

## 2019-05-21 PROCEDURE — 99213 OFFICE O/P EST LOW 20 MIN: CPT | Performed by: NURSE PRACTITIONER

## 2019-05-21 PROCEDURE — 87480 CANDIDA DNA DIR PROBE: CPT

## 2019-05-21 PROCEDURE — 87660 TRICHOMONAS VAGIN DIR PROBE: CPT

## 2019-05-21 PROCEDURE — 96372 THER/PROPH/DIAG INJ SC/IM: CPT | Performed by: NURSE PRACTITIONER

## 2019-05-21 PROCEDURE — 87510 GARDNER VAG DNA DIR PROBE: CPT

## 2019-05-21 RX ORDER — NABUMETONE 500 MG/1
500 TABLET, FILM COATED ORAL 2 TIMES DAILY PRN
Qty: 60 TABLET | Refills: 2 | Status: SHIPPED | OUTPATIENT
Start: 2019-05-21 | End: 2019-05-24

## 2019-05-21 RX ORDER — METRONIDAZOLE 500 MG/1
500 TABLET ORAL 2 TIMES DAILY
Qty: 14 TABLET | Refills: 0 | Status: SHIPPED | OUTPATIENT
Start: 2019-05-21 | End: 2019-05-30

## 2019-05-21 RX ORDER — TRIAMCINOLONE ACETONIDE 40 MG/ML
80 INJECTION, SUSPENSION INTRA-ARTICULAR; INTRAMUSCULAR ONCE
Status: COMPLETED | OUTPATIENT
Start: 2019-05-21 | End: 2019-05-21

## 2019-05-21 RX ADMIN — TRIAMCINOLONE ACETONIDE 80 MG: 40 INJECTION, SUSPENSION INTRA-ARTICULAR; INTRAMUSCULAR at 09:31

## 2019-05-21 NOTE — PROGRESS NOTES
Per LEN Otero, Ms. Alan has been called with recent lab results & recommendations.  Continue current medications and follow-up as planned or sooner if any problems.
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
## 2019-05-21 NOTE — TELEPHONE ENCOUNTER
Per LEN Otero, Ms. Alan has been called with recent lab results & recommendations.  Continue current medications and follow-up as planned or sooner if any problems.      ----- Message from LEN Ramos sent at 5/21/2019  2:53 PM CDT -----  Positive for BV.  I have sent in Flagyl to her pharmacy.  She will take this twice a day.  Please remind her she cannot drink alcohol while on this medication as it will make her terribly sick.

## 2019-05-21 NOTE — PROGRESS NOTES
Iris Alan  1985  34 y.o. female     Patient presents to clinic today with complaint of right foot pain.    2019    Chief Complaint   Patient presents with   • Right Foot - Follow-up, Pain           History of Present Illness    Iris Alan is a 34 y.o. female who presents for relation of continued right foot pain related to bunion deformity.  She previously tried bunion guards and took meloxicam with no improvement.  Pain is worse with weightbearing activity relatively relieved with rest.    Past Medical History:   Diagnosis Date   • Abnormal weight gain    • Acquired keratosis pilaris    • Adult BMI > 30    • Bunion    • Dysuria    • KATHY (generalized anxiety disorder)    • Headache    • Low back pain    • Need for immunization against influenza    • Rash    • Recurrent major depression (CMS/HCC)    • Spasm of back muscles    • Trochanteric tendinitis     bursitis   • Urinary tract infection          Past Surgical History:   Procedure Laterality Date   •  SECTION      x 3   • CHOLECYSTECTOMY WITH INTRAOPERATIVE CHOLANGIOGRAM N/A 2017    Procedure: LAPAROSCOPIC CHOLECYSTECTOMY WITH CHOLANGIOGRAM  (C-ARM#2);  Surgeon: Luis Cummins MD;  Location: [facility];  Service:    • HYSTEROSCOPY  2009   • INJECTION OF MEDICATION  2016    kenalog   • INJECTION OF MEDICATION  2016    toradol   • INJECTION OF MEDICATION  2016    zofran   • LAPAROSCOPY DIAGNOSTIC / BIOPSY / ASPIRATION / LYSIS  2013   • PAP SMEAR  2012   • SALPINGO OOPHORECTOMY  2013   • SALPINGO OOPHORECTOMY Left     right ovary intact   • TOTAL ABDOMINAL HYSTERECTOMY  2014         Family History   Problem Relation Age of Onset   • Depression Mother    • Diabetes Mother    • Breast cancer Maternal Grandmother    • Coronary artery disease Maternal Grandmother    • Diabetes Maternal Grandmother    • Hypertension Maternal Grandmother    • Breast cancer Other    •  "Cancer Paternal Grandmother    • Hypertension Paternal Grandfather          Social History     Socioeconomic History   • Marital status: Single     Spouse name: Not on file   • Number of children: Not on file   • Years of education: Not on file   • Highest education level: Not on file   Tobacco Use   • Smoking status: Former Smoker     Types: Cigarettes     Last attempt to quit: 2012     Years since quittin.4   • Smokeless tobacco: Never Used   Substance and Sexual Activity   • Alcohol use: No   • Drug use: No   • Sexual activity: Yes     Partners: Male     Birth control/protection: Surgical         Current Outpatient Medications   Medication Sig Dispense Refill   • cyclobenzaprine (FLEXERIL) 10 MG tablet Take 1 tablet by mouth 3 (Three) Times a Day As Needed for Muscle Spasms. 21 tablet 0   • metroNIDAZOLE (FLAGYL) 500 MG tablet Take 1 tablet by mouth 2 (Two) Times a Day. 14 tablet 0   • nabumetone (RELAFEN) 500 MG tablet Take 500 mg by mouth 2 (Two) Times a Day As Needed for Mild Pain .       No current facility-administered medications for this visit.          OBJECTIVE    Ht 175.3 cm (69\")   Wt 107 kg (236 lb 9.6 oz)   LMP  (LMP Unknown)   BMI 34.94 kg/m²       Review of Systems   Constitutional: Negative.    HENT: Negative.    Eyes: Negative.    Respiratory: Negative.    Cardiovascular: Negative.    Gastrointestinal: Negative.    Endocrine: Negative.    Genitourinary: Negative.    Musculoskeletal:        Foot pain   Skin: Negative.    Allergic/Immunologic: Negative.    Neurological: Negative.    Hematological: Negative.    Psychiatric/Behavioral: Negative.          Physical Exam   Constitutional: she appears well-developed and well-nourished.   HEENT: Normocephalic. Atraumatic  CV: No CP. RRR  Resp: Non-labored respirations  Psychiatric: she has a normal mood and affect. her behavior is normal.         Lower Extremity Exam:  Vascular: DP/PT pulses palpable 2+.   Minimal right foot edema  Foot warm  CFT " wnl  Neuro: Protective sensation intact, b/l.  DTRs intact  Integument: No open wounds or lesions.  No  scaling  Skin quality normal  Mild erythema over 1st MTPJ  Musculoskeletal: LE muscle strength 5/5.   Gait normal  Ankle ROM decreased without pain or crepitus  STJ ROM full without pain or crepitus  1st MTPJ ROM wnl with mild tenderness. Moderate HAV  +Dorsomedial 1st mtpj eminence. Mild tenderness on palpation.  Flexible HT deformity 2-5              ASSESSMENT AND PLAN    Iris was seen today for follow-up and pain.    Diagnoses and all orders for this visit:    Hallux valgus of right foot  -     Case Request  -     ceFAZolin (ANCEF) 2 g in sodium chloride 0.9 % 100 mL IVPB    Pain  -     XR Foot 3+ View Right    Other orders  -     Follow Anesthesia Guidelines / Standing Orders; Future  -     Obtain Informed Consent; Future  -     Follow Anesthesia Guidelines / Standing Orders; Standing  -     Provide Instructions to Patient Regarding NPO Status; Future  -     Verify NPO Status; Standing  -     Obtain Informed Consent (If Not Done Inpatient or PAT); Standing  -     Radiology Technician to Be Present for Intra-Op C-Arm Use; Standing      -Comprehensive foot and ankle exam performed  -Radiographs ordered and reviewed  -Educated pt on diagnosis, etiology and treatment of hallux abducto valgus.  -Recommend soft, wide toe box accommodative shoe gear.  -Patient has not significantly relieved her pain with typical conservative care.  She is interested in surgical correction.  Had discussion with patient that given her age and severity of deformity I would recommend a Lapidus bunionectomy with possible Weil osteotomy of the second metatarsal.  We discussed all risks, benefits and potential complications including but not limited to delayed healing, risk of infection, nonunion, persistent pain and need for additional procedures, DVT/PE.  We also discussed suspected postoperative course.  -Plan for 5/29/2019  -Recheck  2 weeks          This document has been electronically signed by Hugo Lancaster DPM on May 28, 2019 7:26 AM     EMR Dragon/Transcription disclaimer:   Much of this encounter note is an electronic transcription/translation of spoken language to printed text. The electronic translation of spoken language may permit erroneous, or at times, nonsensical words or phrases to be inadvertently transcribed; Although I have reviewed the note for such errors, some may still exist.    Hugo Lancaster DPM  5/28/2019  7:26 AM

## 2019-05-22 ENCOUNTER — HOSPITAL ENCOUNTER (OUTPATIENT)
Facility: HOSPITAL | Age: 34
Setting detail: HOSPITAL OUTPATIENT SURGERY
End: 2019-05-22
Attending: PODIATRIST | Admitting: PODIATRIST

## 2019-05-22 PROBLEM — M20.11 HALLUX VALGUS OF RIGHT FOOT: Status: ACTIVE | Noted: 2019-05-22

## 2019-05-24 RX ORDER — SODIUM CHLORIDE, SODIUM GLUCONATE, SODIUM ACETATE, POTASSIUM CHLORIDE, AND MAGNESIUM CHLORIDE 526; 502; 368; 37; 30 MG/100ML; MG/100ML; MG/100ML; MG/100ML; MG/100ML
1000 INJECTION, SOLUTION INTRAVENOUS CONTINUOUS
Status: CANCELLED | OUTPATIENT
Start: 2019-05-29

## 2019-05-24 RX ORDER — NABUMETONE 500 MG/1
500 TABLET, FILM COATED ORAL 2 TIMES DAILY PRN
COMMUNITY
End: 2019-05-28 | Stop reason: HOSPADM

## 2019-05-28 ENCOUNTER — TELEPHONE (OUTPATIENT)
Dept: PODIATRY | Facility: CLINIC | Age: 34
End: 2019-05-28
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
## 2019-05-28 DIAGNOSIS — M54.50 ACUTE MIDLINE LOW BACK PAIN WITHOUT SCIATICA: Primary | ICD-10-CM
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
## 2019-05-30 ENCOUNTER — OFFICE VISIT (OUTPATIENT)
Dept: ORTHOPEDIC SURGERY | Facility: CLINIC | Age: 34
End: 2019-05-30

## 2019-05-30 VITALS — OXYGEN SATURATION: 100 % | WEIGHT: 230.5 LBS | BODY MASS INDEX: 34.14 KG/M2 | HEART RATE: 74 BPM | HEIGHT: 69 IN

## 2019-05-30 DIAGNOSIS — M54.50 LUMBAR BACK PAIN: Primary | ICD-10-CM

## 2019-05-30 PROCEDURE — 99203 OFFICE O/P NEW LOW 30 MIN: CPT | Performed by: ORTHOPAEDIC SURGERY

## 2019-05-30 RX ORDER — NABUMETONE 500 MG/1
500 TABLET, FILM COATED ORAL 2 TIMES DAILY
COMMUNITY
End: 2020-10-28

## 2019-05-30 RX ORDER — CHLORZOXAZONE 500 MG/1
500 TABLET ORAL 3 TIMES DAILY PRN
Qty: 30 TABLET | Refills: 1 | Status: SHIPPED | OUTPATIENT
Start: 2019-05-30 | End: 2020-10-28
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
## 2019-06-20 ENCOUNTER — LAB (OUTPATIENT)
Dept: LAB | Facility: HOSPITAL | Age: 34
End: 2019-06-20

## 2019-06-20 DIAGNOSIS — N76.0 BV (BACTERIAL VAGINOSIS): Primary | ICD-10-CM

## 2019-06-20 DIAGNOSIS — N39.0 URINARY TRACT INFECTION WITHOUT HEMATURIA, SITE UNSPECIFIED: ICD-10-CM

## 2019-06-20 DIAGNOSIS — N76.0 BV (BACTERIAL VAGINOSIS): ICD-10-CM

## 2019-06-20 DIAGNOSIS — B96.89 BV (BACTERIAL VAGINOSIS): Primary | ICD-10-CM

## 2019-06-20 DIAGNOSIS — B96.89 BV (BACTERIAL VAGINOSIS): ICD-10-CM

## 2019-06-20 LAB
CANDIDA ALBICANS: NEGATIVE
GARDNERELLA VAGINALIS: NEGATIVE
T VAGINALIS DNA VAG QL PROBE+SIG AMP: NEGATIVE

## 2019-06-20 PROCEDURE — 87510 GARDNER VAG DNA DIR PROBE: CPT

## 2019-06-20 PROCEDURE — 81003 URINALYSIS AUTO W/O SCOPE: CPT

## 2019-06-20 PROCEDURE — 87660 TRICHOMONAS VAGIN DIR PROBE: CPT

## 2019-06-20 PROCEDURE — 87480 CANDIDA DNA DIR PROBE: CPT

## 2019-06-21 ENCOUNTER — OFFICE VISIT (OUTPATIENT)
Dept: ORTHOPEDIC SURGERY | Facility: CLINIC | Age: 34
End: 2019-06-21

## 2019-06-21 ENCOUNTER — TELEPHONE (OUTPATIENT)
Dept: FAMILY MEDICINE CLINIC | Facility: CLINIC | Age: 34
End: 2019-06-21

## 2019-06-21 VITALS — BODY MASS INDEX: 34.36 KG/M2 | HEIGHT: 69 IN | OXYGEN SATURATION: 97 % | HEART RATE: 76 BPM | WEIGHT: 232 LBS

## 2019-06-21 DIAGNOSIS — M54.50 LUMBAR BACK PAIN: Primary | ICD-10-CM

## 2019-06-21 LAB
BILIRUB UR QL STRIP: NEGATIVE
CLARITY UR: CLEAR
COLOR UR: YELLOW
GLUCOSE UR STRIP-MCNC: NEGATIVE MG/DL
HGB UR QL STRIP.AUTO: NEGATIVE
KETONES UR QL STRIP: ABNORMAL
LEUKOCYTE ESTERASE UR QL STRIP.AUTO: NEGATIVE
NITRITE UR QL STRIP: NEGATIVE
PH UR STRIP.AUTO: 6.5 [PH] (ref 5–8)
PROT UR QL STRIP: NEGATIVE
SP GR UR STRIP: 1.02 (ref 1–1.03)
UROBILINOGEN UR QL STRIP: ABNORMAL

## 2019-06-21 PROCEDURE — 99213 OFFICE O/P EST LOW 20 MIN: CPT | Performed by: ORTHOPAEDIC SURGERY

## 2019-06-21 NOTE — TELEPHONE ENCOUNTER
Per LEN Otero, Ms. Tam  has been called with recent lab results & recommendations.  Continue current medications and follow-up as planned or sooner if any problems.        ----- Message from LEN Ramos sent at 6/21/2019  7:12 AM CDT -----  Labs normal, please call or send card!

## 2019-06-21 NOTE — PROGRESS NOTES
Per LEN Otero, Ms. Tam  has been called with recent lab results & recommendations.  Continue current medications and follow-up as planned or sooner if any problems.
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
## 2019-08-07 ENCOUNTER — TRANSCRIBE ORDERS (OUTPATIENT)
Dept: LAB | Facility: HOSPITAL | Age: 34
End: 2019-08-07

## 2019-08-07 DIAGNOSIS — Z00.00 ROUTINE GENERAL MEDICAL EXAMINATION AT A HEALTH CARE FACILITY: Primary | ICD-10-CM

## 2019-08-08 ENCOUNTER — LAB (OUTPATIENT)
Dept: LAB | Facility: HOSPITAL | Age: 34
End: 2019-08-08

## 2019-08-08 DIAGNOSIS — Z01.84 IMMUNITY STATUS TESTING: Primary | ICD-10-CM

## 2019-08-08 DIAGNOSIS — Z01.84 IMMUNITY STATUS TESTING: ICD-10-CM

## 2019-08-08 LAB
ALBUMIN SERPL-MCNC: 4.2 G/DL (ref 3.5–5.2)
ALBUMIN/GLOB SERPL: 1.4 G/DL
ALP SERPL-CCNC: 54 U/L (ref 39–117)
ALT SERPL W P-5'-P-CCNC: 20 U/L (ref 1–33)
ANION GAP SERPL CALCULATED.3IONS-SCNC: 11.6 MMOL/L (ref 5–15)
AST SERPL-CCNC: 13 U/L (ref 1–32)
BASOPHILS # BLD AUTO: 0.05 10*3/MM3 (ref 0–0.2)
BASOPHILS NFR BLD AUTO: 0.5 % (ref 0–1.5)
BILIRUB SERPL-MCNC: 0.5 MG/DL (ref 0.2–1.2)
BUN BLD-MCNC: 18 MG/DL (ref 6–20)
BUN/CREAT SERPL: 23.7 (ref 7–25)
CALCIUM SPEC-SCNC: 9.5 MG/DL (ref 8.6–10.5)
CHLORIDE SERPL-SCNC: 98 MMOL/L (ref 98–107)
CHOLEST SERPL-MCNC: 198 MG/DL (ref 0–200)
CO2 SERPL-SCNC: 28.4 MMOL/L (ref 22–29)
CREAT BLD-MCNC: 0.76 MG/DL (ref 0.57–1)
DEPRECATED RDW RBC AUTO: 42.2 FL (ref 37–54)
EOSINOPHIL # BLD AUTO: 0.08 10*3/MM3 (ref 0–0.4)
EOSINOPHIL NFR BLD AUTO: 0.8 % (ref 0.3–6.2)
ERYTHROCYTE [DISTWIDTH] IN BLOOD BY AUTOMATED COUNT: 12.9 % (ref 12.3–15.4)
GFR SERPL CREATININE-BSD FRML MDRD: 87 ML/MIN/1.73
GLOBULIN UR ELPH-MCNC: 2.9 GM/DL
GLUCOSE BLD-MCNC: 93 MG/DL (ref 65–99)
HCT VFR BLD AUTO: 41.3 % (ref 34–46.6)
HDLC SERPL-MCNC: 54 MG/DL (ref 40–60)
HGB BLD-MCNC: 13.7 G/DL (ref 12–15.9)
IMM GRANULOCYTES # BLD AUTO: 0.04 10*3/MM3 (ref 0–0.05)
IMM GRANULOCYTES NFR BLD AUTO: 0.4 % (ref 0–0.5)
LDLC SERPL CALC-MCNC: 112 MG/DL (ref 0–100)
LDLC/HDLC SERPL: 2.08 {RATIO}
LYMPHOCYTES # BLD AUTO: 2.57 10*3/MM3 (ref 0.7–3.1)
LYMPHOCYTES NFR BLD AUTO: 24.5 % (ref 19.6–45.3)
MCH RBC QN AUTO: 29.8 PG (ref 26.6–33)
MCHC RBC AUTO-ENTMCNC: 33.2 G/DL (ref 31.5–35.7)
MCV RBC AUTO: 90 FL (ref 79–97)
MONOCYTES # BLD AUTO: 0.57 10*3/MM3 (ref 0.1–0.9)
MONOCYTES NFR BLD AUTO: 5.4 % (ref 5–12)
NEUTROPHILS # BLD AUTO: 7.18 10*3/MM3 (ref 1.7–7)
NEUTROPHILS NFR BLD AUTO: 68.4 % (ref 42.7–76)
NRBC BLD AUTO-RTO: 0 /100 WBC (ref 0–0.2)
PLATELET # BLD AUTO: 357 10*3/MM3 (ref 140–450)
PMV BLD AUTO: 10.6 FL (ref 6–12)
POTASSIUM BLD-SCNC: 4.6 MMOL/L (ref 3.5–5.2)
PROT SERPL-MCNC: 7.1 G/DL (ref 6–8.5)
RBC # BLD AUTO: 4.59 10*6/MM3 (ref 3.77–5.28)
SODIUM BLD-SCNC: 138 MMOL/L (ref 136–145)
T4 FREE SERPL-MCNC: 1.08 NG/DL (ref 0.93–1.7)
TRIGL SERPL-MCNC: 158 MG/DL (ref 0–150)
TSH SERPL DL<=0.05 MIU/L-ACNC: 2.23 MIU/ML (ref 0.27–4.2)
VLDLC SERPL-MCNC: 31.6 MG/DL (ref 5–40)
WBC NRBC COR # BLD: 10.49 10*3/MM3 (ref 3.4–10.8)

## 2019-08-08 PROCEDURE — 36415 COLL VENOUS BLD VENIPUNCTURE: CPT | Performed by: FAMILY MEDICINE

## 2019-08-08 PROCEDURE — 86480 TB TEST CELL IMMUN MEASURE: CPT

## 2019-08-08 PROCEDURE — 84443 ASSAY THYROID STIM HORMONE: CPT | Performed by: FAMILY MEDICINE

## 2019-08-08 PROCEDURE — 80053 COMPREHEN METABOLIC PANEL: CPT | Performed by: FAMILY MEDICINE

## 2019-08-08 PROCEDURE — 84439 ASSAY OF FREE THYROXINE: CPT | Performed by: FAMILY MEDICINE

## 2019-08-08 PROCEDURE — 80061 LIPID PANEL: CPT | Performed by: FAMILY MEDICINE

## 2019-08-08 PROCEDURE — 85025 COMPLETE CBC W/AUTO DIFF WBC: CPT | Performed by: FAMILY MEDICINE

## 2019-08-08 PROCEDURE — 86787 VARICELLA-ZOSTER ANTIBODY: CPT

## 2019-08-08 PROCEDURE — 86762 RUBELLA ANTIBODY: CPT

## 2019-08-08 PROCEDURE — 86735 MUMPS ANTIBODY: CPT

## 2019-08-08 PROCEDURE — 86765 RUBEOLA ANTIBODY: CPT

## 2019-08-09 LAB
MEV IGG SER IA-ACNC: NEGATIVE
MUV IGG SER IA-ACNC: POSITIVE
RUBV IGG SERPL IA-ACNC: POSITIVE
VZV IGG SER IA-ACNC: 562 INDEX
VZV IGM SER IA-ACNC: <0.91 INDEX (ref 0–0.9)

## 2019-08-12 ENCOUNTER — TELEPHONE (OUTPATIENT)
Dept: FAMILY MEDICINE CLINIC | Facility: CLINIC | Age: 34
End: 2019-08-12

## 2019-08-12 NOTE — PROGRESS NOTES
Per LEN Otero, Ms. Alan has been called with recent lab results & recommendations.  Continue current medications and follow-up as planned or sooner if any problems.    Jemma,   She said she starts classes on Monday.  Do we have the Varicella Injection she needs? Does she need the Rubeola Vaccine or a repeat MMR?  Please Advise if you can do these between now and Monday or does she need to go to one of the Pharmacies?    Please Advise  Thank you    ZEYNEP Hood

## 2019-08-12 NOTE — TELEPHONE ENCOUNTER
Per LEN Otero, Ms. Alan has been called with recent lab results & recommendations.  Continue current medications and follow-up as planned or sooner if any problems.    Jemma,   She said she starts classes on Monday.  Do we have the Varicella Injection she needs? Does she need the Rubeola Vaccine or a repeat MMR?  Please Advise if you can do these between now and Monday or does she need to go to one of the Pharmacies?    Please Advise  Thank you    ZEYNEP Hood    ----- Message from LEN Ramos sent at 8/11/2019  6:51 PM CDT -----  No detectable antibodies.  Should be revaccinated.

## 2019-08-13 ENCOUNTER — CLINICAL SUPPORT (OUTPATIENT)
Dept: FAMILY MEDICINE CLINIC | Facility: CLINIC | Age: 34
End: 2019-08-13

## 2019-08-13 DIAGNOSIS — Z23 VARICELLA VACCINATION: Primary | ICD-10-CM

## 2019-08-13 LAB
QUANTIFERON CRITERIA: NORMAL
QUANTIFERON MITOGEN VALUE: >10 IU/ML
QUANTIFERON NIL VALUE: 0.15 IU/ML
QUANTIFERON TB1 AG VALUE: 0.19 IU/ML
QUANTIFERON TB2 AG VALUE: 0.15 IU/ML
QUANTIFERON-TB GOLD PLUS: NEGATIVE

## 2019-08-13 PROCEDURE — 90716 VAR VACCINE LIVE SUBQ: CPT | Performed by: NURSE PRACTITIONER

## 2019-08-13 PROCEDURE — 90471 IMMUNIZATION ADMIN: CPT | Performed by: NURSE PRACTITIONER

## 2019-09-23 ENCOUNTER — TELEPHONE (OUTPATIENT)
Dept: FAMILY MEDICINE CLINIC | Facility: CLINIC | Age: 34
End: 2019-09-23

## 2020-09-26 ENCOUNTER — APPOINTMENT (OUTPATIENT)
Dept: CT IMAGING | Facility: HOSPITAL | Age: 35
End: 2020-09-26

## 2020-09-26 ENCOUNTER — HOSPITAL ENCOUNTER (EMERGENCY)
Facility: HOSPITAL | Age: 35
Discharge: HOME | End: 2020-09-26
Attending: FAMILY MEDICINE | Admitting: FAMILY MEDICINE

## 2020-09-26 VITALS
SYSTOLIC BLOOD PRESSURE: 142 MMHG | DIASTOLIC BLOOD PRESSURE: 81 MMHG | HEART RATE: 91 BPM | OXYGEN SATURATION: 100 % | RESPIRATION RATE: 18 BRPM | TEMPERATURE: 97.5 F

## 2020-09-26 DIAGNOSIS — S39.012A STRAIN OF LUMBAR REGION, INITIAL ENCOUNTER: ICD-10-CM

## 2020-09-26 DIAGNOSIS — N30.90 CYSTITIS: Primary | ICD-10-CM

## 2020-09-26 LAB
ALBUMIN SERPL-MCNC: 4.2 G/DL (ref 3.5–5.2)
ALBUMIN/GLOB SERPL: 1.4 G/DL
ALP SERPL-CCNC: 53 U/L (ref 39–117)
ALT SERPL W P-5'-P-CCNC: 12 U/L (ref 1–33)
ANION GAP SERPL CALCULATED.3IONS-SCNC: 9 MMOL/L (ref 5–15)
AST SERPL-CCNC: 13 U/L (ref 1–32)
BACTERIA UR QL AUTO: ABNORMAL /HPF
BASOPHILS # BLD AUTO: 0.03 10*3/MM3 (ref 0–0.2)
BASOPHILS NFR BLD AUTO: 0.2 % (ref 0–1.5)
BILIRUB SERPL-MCNC: 0.3 MG/DL (ref 0–1.2)
BILIRUB UR QL STRIP: NEGATIVE
BUN SERPL-MCNC: 17 MG/DL (ref 6–20)
BUN/CREAT SERPL: 25.4 (ref 7–25)
CALCIUM SPEC-SCNC: 9.1 MG/DL (ref 8.6–10.5)
CHLORIDE SERPL-SCNC: 103 MMOL/L (ref 98–107)
CLARITY UR: ABNORMAL
CO2 SERPL-SCNC: 29 MMOL/L (ref 22–29)
COLOR UR: YELLOW
CREAT SERPL-MCNC: 0.67 MG/DL (ref 0.57–1)
DEPRECATED RDW RBC AUTO: 42.2 FL (ref 37–54)
EOSINOPHIL # BLD AUTO: 0.17 10*3/MM3 (ref 0–0.4)
EOSINOPHIL NFR BLD AUTO: 1.4 % (ref 0.3–6.2)
ERYTHROCYTE [DISTWIDTH] IN BLOOD BY AUTOMATED COUNT: 13.2 % (ref 12.3–15.4)
GFR SERPL CREATININE-BSD FRML MDRD: 100 ML/MIN/1.73
GLOBULIN UR ELPH-MCNC: 3.1 GM/DL
GLUCOSE SERPL-MCNC: 140 MG/DL (ref 65–99)
GLUCOSE UR STRIP-MCNC: NEGATIVE MG/DL
HCT VFR BLD AUTO: 37.7 % (ref 34–46.6)
HGB BLD-MCNC: 12.5 G/DL (ref 12–15.9)
HGB UR QL STRIP.AUTO: ABNORMAL
HOLD SPECIMEN: NORMAL
HOLD SPECIMEN: NORMAL
HYALINE CASTS UR QL AUTO: ABNORMAL /LPF
IMM GRANULOCYTES # BLD AUTO: 0.03 10*3/MM3 (ref 0–0.05)
IMM GRANULOCYTES NFR BLD AUTO: 0.2 % (ref 0–0.5)
KETONES UR QL STRIP: NEGATIVE
LEUKOCYTE ESTERASE UR QL STRIP.AUTO: ABNORMAL
LIPASE SERPL-CCNC: 30 U/L (ref 13–60)
LYMPHOCYTES # BLD AUTO: 3.38 10*3/MM3 (ref 0.7–3.1)
LYMPHOCYTES NFR BLD AUTO: 28 % (ref 19.6–45.3)
MCH RBC QN AUTO: 29.2 PG (ref 26.6–33)
MCHC RBC AUTO-ENTMCNC: 33.2 G/DL (ref 31.5–35.7)
MCV RBC AUTO: 88.1 FL (ref 79–97)
MONOCYTES # BLD AUTO: 0.76 10*3/MM3 (ref 0.1–0.9)
MONOCYTES NFR BLD AUTO: 6.3 % (ref 5–12)
MUCOUS THREADS URNS QL MICRO: ABNORMAL /HPF
NEUTROPHILS NFR BLD AUTO: 63.9 % (ref 42.7–76)
NEUTROPHILS NFR BLD AUTO: 7.72 10*3/MM3 (ref 1.7–7)
NITRITE UR QL STRIP: NEGATIVE
NRBC BLD AUTO-RTO: 0 /100 WBC (ref 0–0.2)
PH UR STRIP.AUTO: 6 [PH] (ref 5–9)
PLATELET # BLD AUTO: 342 10*3/MM3 (ref 140–450)
PMV BLD AUTO: 9.7 FL (ref 6–12)
POTASSIUM SERPL-SCNC: 3.4 MMOL/L (ref 3.5–5.2)
PROT SERPL-MCNC: 7.3 G/DL (ref 6–8.5)
PROT UR QL STRIP: NEGATIVE
RBC # BLD AUTO: 4.28 10*6/MM3 (ref 3.77–5.28)
RBC # UR: ABNORMAL /HPF
REF LAB TEST METHOD: ABNORMAL
SODIUM SERPL-SCNC: 141 MMOL/L (ref 136–145)
SP GR UR STRIP: 1.02 (ref 1–1.03)
SQUAMOUS #/AREA URNS HPF: ABNORMAL /HPF
UROBILINOGEN UR QL STRIP: ABNORMAL
WBC # BLD AUTO: 12.09 10*3/MM3 (ref 3.4–10.8)
WBC UR QL AUTO: ABNORMAL /HPF
WHOLE BLOOD HOLD SPECIMEN: NORMAL
WHOLE BLOOD HOLD SPECIMEN: NORMAL

## 2020-09-26 PROCEDURE — 80053 COMPREHEN METABOLIC PANEL: CPT | Performed by: FAMILY MEDICINE

## 2020-09-26 PROCEDURE — 74176 CT ABD & PELVIS W/O CONTRAST: CPT

## 2020-09-26 PROCEDURE — 96375 TX/PRO/DX INJ NEW DRUG ADDON: CPT

## 2020-09-26 PROCEDURE — 25010000002 CEFTRIAXONE: Performed by: NURSE PRACTITIONER

## 2020-09-26 PROCEDURE — 85025 COMPLETE CBC W/AUTO DIFF WBC: CPT | Performed by: FAMILY MEDICINE

## 2020-09-26 PROCEDURE — 87077 CULTURE AEROBIC IDENTIFY: CPT | Performed by: NURSE PRACTITIONER

## 2020-09-26 PROCEDURE — 81001 URINALYSIS AUTO W/SCOPE: CPT | Performed by: FAMILY MEDICINE

## 2020-09-26 PROCEDURE — 83690 ASSAY OF LIPASE: CPT | Performed by: FAMILY MEDICINE

## 2020-09-26 PROCEDURE — 25010000002 ONDANSETRON PER 1 MG: Performed by: NURSE PRACTITIONER

## 2020-09-26 PROCEDURE — 99283 EMERGENCY DEPT VISIT LOW MDM: CPT

## 2020-09-26 PROCEDURE — 25010000002 KETOROLAC TROMETHAMINE PER 15 MG: Performed by: NURSE PRACTITIONER

## 2020-09-26 PROCEDURE — 96365 THER/PROPH/DIAG IV INF INIT: CPT

## 2020-09-26 PROCEDURE — 87186 SC STD MICRODIL/AGAR DIL: CPT | Performed by: NURSE PRACTITIONER

## 2020-09-26 PROCEDURE — 87086 URINE CULTURE/COLONY COUNT: CPT | Performed by: NURSE PRACTITIONER

## 2020-09-26 RX ORDER — DICLOFENAC SODIUM 75 MG/1
75 TABLET, DELAYED RELEASE ORAL 2 TIMES DAILY
Qty: 10 TABLET | Refills: 0 | Status: SHIPPED | OUTPATIENT
Start: 2020-09-26 | End: 2020-10-01

## 2020-09-26 RX ORDER — CIPROFLOXACIN 500 MG/1
500 TABLET, FILM COATED ORAL 2 TIMES DAILY
Qty: 10 TABLET | Refills: 0 | Status: SHIPPED | OUTPATIENT
Start: 2020-09-26 | End: 2020-10-01

## 2020-09-26 RX ORDER — KETOROLAC TROMETHAMINE 30 MG/ML
30 INJECTION, SOLUTION INTRAMUSCULAR; INTRAVENOUS EVERY 6 HOURS PRN
Status: DISCONTINUED | OUTPATIENT
Start: 2020-09-26 | End: 2020-09-26 | Stop reason: HOSPADM

## 2020-09-26 RX ORDER — ONDANSETRON 2 MG/ML
4 INJECTION INTRAMUSCULAR; INTRAVENOUS ONCE
Status: COMPLETED | OUTPATIENT
Start: 2020-09-26 | End: 2020-09-26

## 2020-09-26 RX ORDER — HYDROCODONE BITARTRATE AND ACETAMINOPHEN 5; 325 MG/1; MG/1
1 TABLET ORAL EVERY 6 HOURS PRN
Qty: 12 TABLET | Refills: 0 | Status: SHIPPED | OUTPATIENT
Start: 2020-09-26 | End: 2020-09-29

## 2020-09-26 RX ORDER — SODIUM CHLORIDE 0.9 % (FLUSH) 0.9 %
10 SYRINGE (ML) INJECTION AS NEEDED
Status: DISCONTINUED | OUTPATIENT
Start: 2020-09-26 | End: 2020-09-26 | Stop reason: HOSPADM

## 2020-09-26 RX ORDER — TAMSULOSIN HYDROCHLORIDE 0.4 MG/1
1 CAPSULE ORAL DAILY
Qty: 14 CAPSULE | Refills: 0 | Status: SHIPPED | OUTPATIENT
Start: 2020-09-26 | End: 2020-10-10

## 2020-09-26 RX ADMIN — SODIUM CHLORIDE 1000 ML: 900 INJECTION, SOLUTION INTRAVENOUS at 10:04

## 2020-09-26 RX ADMIN — ONDANSETRON HYDROCHLORIDE 4 MG: 2 INJECTION, SOLUTION INTRAMUSCULAR; INTRAVENOUS at 10:04

## 2020-09-26 RX ADMIN — KETOROLAC TROMETHAMINE 30 MG: 30 INJECTION, SOLUTION INTRAMUSCULAR at 10:03

## 2020-09-26 RX ADMIN — CEFTRIAXONE 1 G: 1 INJECTION, POWDER, FOR SOLUTION INTRAMUSCULAR; INTRAVENOUS at 11:09

## 2020-09-26 NOTE — ED PROVIDER NOTES
Subjective   35-year-old female the emergency department complaining of left flank and left CVA tenderness.  Reports onset last night.  Patient has a noted history of kidney stones.  Presents tachycardic in the emergency department. Also com planing of frequency and urgency.      History provided by:  Patient   used: No        Review of Systems   Constitutional: Negative for chills, fatigue and fever.   HENT: Negative for congestion.    Respiratory: Negative for shortness of breath.    Cardiovascular: Negative for chest pain and palpitations.   Gastrointestinal: Positive for abdominal pain. Negative for nausea and vomiting.   Genitourinary: Positive for flank pain and urgency.   Musculoskeletal: Positive for back pain.   Skin: Negative for wound.   Allergic/Immunologic: Negative for immunocompromised state.   Neurological: Negative for weakness.   Hematological: Negative for adenopathy.   Psychiatric/Behavioral: Negative for confusion.   All other systems reviewed and are negative.      Past Medical History:   Diagnosis Date   • Abnormal weight gain    • Acquired keratosis pilaris    • Adult BMI > 30    • Anemia    • Bunion    • Dysuria    • KATHY (generalized anxiety disorder)    • Headache    • Low back pain    • Need for immunization against influenza    • Rash    • Recurrent major depression (CMS/HCC)    • Spasm of back muscles    • Trochanteric tendinitis     bursitis   • Urinary tract infection        No Known Allergies    Past Surgical History:   Procedure Laterality Date   •  SECTION      x 3   • CHOLECYSTECTOMY WITH INTRAOPERATIVE CHOLANGIOGRAM N/A 2017    Procedure: LAPAROSCOPIC CHOLECYSTECTOMY WITH CHOLANGIOGRAM  (C-ARM#2);  Surgeon: Luis uCmmins MD;  Location: [facility];  Service:    • HYSTEROSCOPY  2009   • INJECTION OF MEDICATION  2016    kenalog   • INJECTION OF MEDICATION  2016    toradol   • INJECTION OF MEDICATION  2016    zofran   •  LAPAROSCOPY DIAGNOSTIC / BIOPSY / ASPIRATION / LYSIS  2013   • PAP SMEAR  2012   • SALPINGO OOPHORECTOMY  2013   • SALPINGO OOPHORECTOMY Left     right ovary intact   • TOTAL ABDOMINAL HYSTERECTOMY  2014       Family History   Problem Relation Age of Onset   • Depression Mother    • Diabetes Mother    • Breast cancer Maternal Grandmother    • Coronary artery disease Maternal Grandmother    • Diabetes Maternal Grandmother    • Hypertension Maternal Grandmother    • Breast cancer Other    • Cancer Paternal Grandmother    • Hypertension Paternal Grandfather        Social History     Socioeconomic History   • Marital status: Single     Spouse name: Not on file   • Number of children: Not on file   • Years of education: Not on file   • Highest education level: Not on file   Tobacco Use   • Smoking status: Former Smoker     Types: Cigarettes     Quit date:      Years since quittin.7   • Smokeless tobacco: Never Used   Substance and Sexual Activity   • Alcohol use: No   • Drug use: No   • Sexual activity: Yes     Partners: Male     Birth control/protection: Surgical           Objective   Physical Exam  Vitals signs and nursing note reviewed.   Constitutional:       Appearance: She is well-developed.   HENT:      Head: Normocephalic.      Nose: Nose normal.   Eyes:      Conjunctiva/sclera: Conjunctivae normal.      Pupils: Pupils are equal, round, and reactive to light.   Neck:      Musculoskeletal: Normal range of motion.   Cardiovascular:      Rate and Rhythm: Normal rate and regular rhythm.      Heart sounds: Normal heart sounds.   Pulmonary:      Effort: Pulmonary effort is normal.      Breath sounds: Normal breath sounds.   Abdominal:      Palpations: Abdomen is soft.      Tenderness: There is left CVA tenderness.   Musculoskeletal: Normal range of motion.   Skin:     General: Skin is warm and dry.   Neurological:      Mental Status: She is alert and oriented to person, place, and time.       GCS: GCS eye subscore is 4. GCS verbal subscore is 5. GCS motor subscore is 6.         Procedures           ED Course  ED Course as of Sep 26 1106   Sat Sep 26, 2020   1102 [address] [id number]    [JL]      ED Course User Index  [JL] Roger Blue, LEN      CT Abdomen Pelvis Without Contrast   Final Result   No nephroureterolithiasis.      No acute abnormality within the abdomen and pelvis within the   limitations of noncontrast technique.      Electronically signed by:  Braxton Sharp MD  9/26/2020   10:28 AM CDT Workstation: [id number]        Results for orders placed or performed during the hospital encounter of 09/26/20   Urinalysis With Microscopic If Indicated (No Culture) - Urine, Clean Catch    Specimen: Urine, Clean Catch   Result Value Ref Range    Color, UA Yellow Yellow, Straw, Dark Yellow, Claudine    Appearance, UA Cloudy (A) Clear    pH, UA 6.0 5.0 - 9.0    Specific Gravity, UA 1.018 1.003 - 1.030    Glucose, UA Negative Negative    Ketones, UA Negative Negative    Bilirubin, UA Negative Negative    Blood, UA Large (3+) (A) Negative    Protein, UA Negative Negative    Leuk Esterase, UA Trace (A) Negative    Nitrite, UA Negative Negative    Urobilinogen, UA 0.2 E.U./dL 0.2 - 1.0 E.U./dL   Comprehensive Metabolic Panel    Specimen: Blood   Result Value Ref Range    Glucose 140 (H) 65 - 99 mg/dL    BUN 17 6 - 20 mg/dL    Creatinine 0.67 0.57 - 1.00 mg/dL    Sodium 141 136 - 145 mmol/L    Potassium 3.4 (L) 3.5 - 5.2 mmol/L    Chloride 103 98 - 107 mmol/L    CO2 29.0 22.0 - 29.0 mmol/L    Calcium 9.1 8.6 - 10.5 mg/dL    Total Protein 7.3 6.0 - 8.5 g/dL    Albumin 4.20 3.50 - 5.20 g/dL    ALT (SGPT) 12 1 - 33 U/L    AST (SGOT) 13 1 - 32 U/L    Alkaline Phosphatase 53 39 - 117 U/L    Total Bilirubin 0.3 0.0 - 1.2 mg/dL    eGFR Non African Amer 100 >60 mL/min/1.73    Globulin 3.1 gm/dL    A/G Ratio 1.4 g/dL    BUN/Creatinine Ratio 25.4 (H) 7.0 - 25.0    Anion Gap 9.0 5.0 - 15.0 mmol/L   Lipase     Specimen: Blood   Result Value Ref Range    Lipase 30 13 - 60 U/L   CBC Auto Differential    Specimen: Blood   Result Value Ref Range    WBC 12.09 (H) 3.40 - 10.80 10*3/mm3    RBC 4.28 3.77 - 5.28 10*6/mm3    Hemoglobin 12.5 12.0 - 15.9 g/dL    Hematocrit 37.7 34.0 - 46.6 %    MCV 88.1 79.0 - 97.0 fL    MCH 29.2 26.6 - 33.0 pg    MCHC 33.2 31.5 - 35.7 g/dL    RDW 13.2 12.3 - 15.4 %    RDW-SD 42.2 37.0 - 54.0 fl    MPV 9.7 6.0 - 12.0 fL    Platelets 342 140 - 450 10*3/mm3    Neutrophil % 63.9 42.7 - 76.0 %    Lymphocyte % 28.0 19.6 - 45.3 %    Monocyte % 6.3 5.0 - 12.0 %    Eosinophil % 1.4 0.3 - 6.2 %    Basophil % 0.2 0.0 - 1.5 %    Immature Grans % 0.2 0.0 - 0.5 %    Neutrophils, Absolute 7.72 (H) 1.70 - 7.00 10*3/mm3    Lymphocytes, Absolute 3.38 (H) 0.70 - 3.10 10*3/mm3    Monocytes, Absolute 0.76 0.10 - 0.90 10*3/mm3    Eosinophils, Absolute 0.17 0.00 - 0.40 10*3/mm3    Basophils, Absolute 0.03 0.00 - 0.20 10*3/mm3    Immature Grans, Absolute 0.03 0.00 - 0.05 10*3/mm3    nRBC 0.0 0.0 - 0.2 /100 WBC   Light Blue Top   Result Value Ref Range    Extra Tube hold for add-on    Green Top (Gel)   Result Value Ref Range    Extra Tube Hold for add-ons.    Lavender Top   Result Value Ref Range    Extra Tube hold for add-on    Gold Top - SST   Result Value Ref Range    Extra Tube Hold for add-ons.                                         MDM  Number of Diagnoses or Management Options  Cystitis: new and requires workup  Diagnosis management comments: 35-year-old female in the emergency department see original HPI for details.  Patient reports noted history of kidney stones that she is passed.  She tells me that her previous CT did not show any type of stones however she did have some the past that she took 2 of her urologist or calcium oxalate stones.  Urine shows moderate amount of blood with small leukocytes.  Will place order for culture treat prophylactically.  CT does not show any type of scan or hydronephrosis or  hydroureter.  Due to the significant back pain urinary complaints and history of stones will treat as pyelonephritis for now.  Patient directed to follow-up with primary care, urology and return to the emergency department if needed       Amount and/or Complexity of Data Reviewed  Clinical lab tests: reviewed and ordered  Tests in the radiology section of CPT®: reviewed and ordered    Risk of Complications, Morbidity, and/or Mortality  Presenting problems: moderate  Diagnostic procedures: moderate  Management options: moderate    Patient Progress  Patient progress: stable      Final diagnoses:   Cystitis   Strain of lumbar region, initial encounter            Roger Blue, APRN  09/26/20 1106

## 2020-09-28 LAB — BACTERIA SPEC AEROBE CULT: ABNORMAL

## 2020-10-28 ENCOUNTER — OFFICE VISIT (OUTPATIENT)
Dept: OBSTETRICS AND GYNECOLOGY | Facility: CLINIC | Age: 35
End: 2020-10-28

## 2020-10-28 VITALS
SYSTOLIC BLOOD PRESSURE: 116 MMHG | WEIGHT: 231 LBS | BODY MASS INDEX: 34.21 KG/M2 | DIASTOLIC BLOOD PRESSURE: 68 MMHG | HEIGHT: 69 IN

## 2020-10-28 DIAGNOSIS — Z01.419 WOMEN'S ANNUAL ROUTINE GYNECOLOGICAL EXAMINATION: Primary | ICD-10-CM

## 2020-10-28 DIAGNOSIS — R10.2 PELVIC PAIN: ICD-10-CM

## 2020-10-28 PROCEDURE — 99395 PREV VISIT EST AGE 18-39: CPT | Performed by: NURSE PRACTITIONER

## 2020-10-28 NOTE — PROGRESS NOTES
Subjective   Iris Alan is a 35 y.o. Annual gynecological exam    Hysterectomy and LSO 2013, r/t menorrhagia  Pap: NIL, 2012    Pt presents for annual gynecological exam with complaints of right sided pelvic pain.  She reports history of ovarian cysts.      Gynecologic Exam  The patient's primary symptoms include pelvic pain. The patient's pertinent negatives include no genital itching, genital lesions, genital odor, genital rash, vaginal bleeding or vaginal discharge. This is a recurrent problem. The current episode started more than 1 month ago. The problem occurs intermittently. The problem has been waxing and waning. The pain is moderate. The problem affects the right side. She is not pregnant. Pertinent negatives include no abdominal pain, chills, constipation, diarrhea, dysuria, fever, flank pain, frequency, headaches, hematuria, rash or urgency. She is sexually active. No, her partner does not have an STD. She uses hysterectomy for contraception. Her past medical history is significant for menorrhagia and ovarian cysts.       The following portions of the patient's history were reviewed and updated as appropriate: allergies, current medications, past family history, past medical history, past social history, past surgical history and problem list.    Review of Systems   Constitutional: Negative for chills, diaphoresis, fatigue, fever and unexpected weight change.   Respiratory: Negative for apnea, chest tightness and shortness of breath.    Cardiovascular: Negative for chest pain and palpitations.   Gastrointestinal: Negative for abdominal distention, abdominal pain, constipation and diarrhea.   Genitourinary: Positive for menorrhagia and pelvic pain. Negative for decreased urine volume, difficulty urinating, dyspareunia, dysuria, enuresis, flank pain, frequency, genital sores, hematuria, urgency, vaginal bleeding, vaginal discharge and vaginal pain.   Skin: Negative for rash.   Neurological:  Negative for headaches.   Psychiatric/Behavioral: Negative for sleep disturbance and suicidal ideas.         Objective   Physical Exam  Vitals signs and nursing note reviewed. Exam conducted with a chaperone present.   Constitutional:       Appearance: She is well-developed.   Neck:      Thyroid: No thyromegaly.   Cardiovascular:      Rate and Rhythm: Normal rate and regular rhythm.      Heart sounds: Normal heart sounds.   Pulmonary:      Effort: Pulmonary effort is normal.      Breath sounds: Normal breath sounds.   Chest:      Breasts: Breasts are symmetrical.         Right: No inverted nipple, mass, nipple discharge, skin change or tenderness.         Left: No inverted nipple, mass, nipple discharge, skin change or tenderness.   Abdominal:      General: Bowel sounds are normal. There is no distension.      Palpations: Abdomen is soft.      Tenderness: There is no abdominal tenderness.   Genitourinary:     General: Normal vulva.      Labia:         Right: No rash, tenderness, lesion or injury.         Left: No rash, tenderness, lesion or injury.       Vagina: Normal.      Adnexa:         Right: Tenderness present. No mass or fullness.        Comments: Uterus, cervix and left adnexa absent. Well healed vaginal cuff. Pap smear not indicated.  Skin:     General: Skin is warm and dry.   Neurological:      Mental Status: She is alert and oriented to person, place, and time.   Psychiatric:         Behavior: Behavior normal.           Assessment/Plan   Diagnoses and all orders for this visit:    1. Women's annual routine gynecological examination (Primary)    2. Pelvic pain  -     US Non-ob Transvaginal; Future      Pt educated on regular SBEs.  TVUS to r/o right ovarian mass.  Will follow up with results and plan of care.  RTC in 1 year for annual gynecological exam or sooner if needed.

## 2020-11-17 PROCEDURE — 87635 SARS-COV-2 COVID-19 AMP PRB: CPT | Performed by: NURSE PRACTITIONER

## 2020-11-28 ENCOUNTER — HOSPITAL ENCOUNTER (EMERGENCY)
Facility: HOSPITAL | Age: 35
Discharge: HOME | End: 2020-11-28
Attending: EMERGENCY MEDICINE | Admitting: EMERGENCY MEDICINE

## 2020-11-28 ENCOUNTER — APPOINTMENT (OUTPATIENT)
Dept: GENERAL RADIOLOGY | Facility: HOSPITAL | Age: 35
End: 2020-11-28

## 2020-11-28 VITALS
HEART RATE: 81 BPM | DIASTOLIC BLOOD PRESSURE: 89 MMHG | HEIGHT: 69 IN | BODY MASS INDEX: 32.58 KG/M2 | WEIGHT: 220 LBS | TEMPERATURE: 97.2 F | RESPIRATION RATE: 20 BRPM | OXYGEN SATURATION: 100 % | SYSTOLIC BLOOD PRESSURE: 134 MMHG

## 2020-11-28 DIAGNOSIS — R05.9 COUGH: ICD-10-CM

## 2020-11-28 DIAGNOSIS — R06.02 SHORTNESS OF BREATH: ICD-10-CM

## 2020-11-28 DIAGNOSIS — U07.1 COVID-19 VIRUS INFECTION: Primary | ICD-10-CM

## 2020-11-28 PROCEDURE — 94640 AIRWAY INHALATION TREATMENT: CPT

## 2020-11-28 PROCEDURE — 93005 ELECTROCARDIOGRAM TRACING: CPT | Performed by: EMERGENCY MEDICINE

## 2020-11-28 PROCEDURE — 93010 ELECTROCARDIOGRAM REPORT: CPT | Performed by: INTERNAL MEDICINE

## 2020-11-28 PROCEDURE — 99283 EMERGENCY DEPT VISIT LOW MDM: CPT

## 2020-11-28 PROCEDURE — 71045 X-RAY EXAM CHEST 1 VIEW: CPT

## 2020-11-28 RX ORDER — ONDANSETRON 4 MG/1
4 TABLET, ORALLY DISINTEGRATING ORAL EVERY 6 HOURS PRN
Qty: 10 TABLET | Refills: 0 | Status: SHIPPED | OUTPATIENT
Start: 2020-11-28 | End: 2021-01-14

## 2020-11-28 RX ORDER — ALBUTEROL SULFATE 2.5 MG/3ML
2.5 SOLUTION RESPIRATORY (INHALATION)
Status: DISCONTINUED | OUTPATIENT
Start: 2020-11-28 | End: 2020-11-28

## 2020-11-28 RX ORDER — GUAIFENESIN/DEXTROMETHORPHAN 100-10MG/5
5 SYRUP ORAL ONCE
Status: COMPLETED | OUTPATIENT
Start: 2020-11-28 | End: 2020-11-28

## 2020-11-28 RX ORDER — ALBUTEROL SULFATE 90 UG/1
2 AEROSOL, METERED RESPIRATORY (INHALATION)
Status: DISCONTINUED | OUTPATIENT
Start: 2020-11-28 | End: 2020-11-29 | Stop reason: HOSPADM

## 2020-11-28 RX ORDER — GUAIFENESIN DEXTROMETHORPHAN HYDROBROMIDE ORAL SOLUTION 10; 100 MG/5ML; MG/5ML
5 SOLUTION ORAL EVERY 12 HOURS
Qty: 100 ML | Refills: 0 | OUTPATIENT
Start: 2020-11-28 | End: 2021-01-10

## 2020-11-28 RX ORDER — IBUPROFEN 600 MG/1
600 TABLET ORAL ONCE
Status: COMPLETED | OUTPATIENT
Start: 2020-11-28 | End: 2020-11-28

## 2020-11-28 RX ADMIN — IBUPROFEN 600 MG: 600 TABLET ORAL at 22:12

## 2020-11-28 RX ADMIN — ALBUTEROL SULFATE 2 PUFF: 90 AEROSOL, METERED RESPIRATORY (INHALATION) at 22:12

## 2020-11-28 RX ADMIN — GUAIFENESIN AND DEXTROMETHORPHAN 5 ML: 100; 10 SYRUP ORAL at 22:12

## 2020-11-29 LAB
QT INTERVAL: 366 MS
QTC INTERVAL: 447 MS

## 2021-01-10 ENCOUNTER — HOSPITAL ENCOUNTER (EMERGENCY)
Facility: HOSPITAL | Age: 36
Discharge: HOME | End: 2021-01-10
Attending: FAMILY MEDICINE | Admitting: FAMILY MEDICINE

## 2021-01-10 ENCOUNTER — APPOINTMENT (OUTPATIENT)
Dept: GENERAL RADIOLOGY | Facility: HOSPITAL | Age: 36
End: 2021-01-10

## 2021-01-10 VITALS
HEART RATE: 67 BPM | SYSTOLIC BLOOD PRESSURE: 128 MMHG | TEMPERATURE: 98.9 F | WEIGHT: 220.02 LBS | OXYGEN SATURATION: 99 % | HEIGHT: 69 IN | RESPIRATION RATE: 18 BRPM | DIASTOLIC BLOOD PRESSURE: 77 MMHG | BODY MASS INDEX: 32.59 KG/M2

## 2021-01-10 DIAGNOSIS — J18.9 PNEUMONIA DUE TO INFECTIOUS ORGANISM, UNSPECIFIED LATERALITY, UNSPECIFIED PART OF LUNG: Primary | ICD-10-CM

## 2021-01-10 LAB
ALBUMIN SERPL-MCNC: 4.2 G/DL (ref 3.5–5.2)
ALBUMIN/GLOB SERPL: 1.2 G/DL
ALP SERPL-CCNC: 70 U/L (ref 39–117)
ALT SERPL W P-5'-P-CCNC: 12 U/L (ref 1–33)
ANION GAP SERPL CALCULATED.3IONS-SCNC: 12 MMOL/L (ref 5–15)
AST SERPL-CCNC: 11 U/L (ref 1–32)
BASOPHILS # BLD AUTO: 0.03 10*3/MM3 (ref 0–0.2)
BASOPHILS NFR BLD AUTO: 0.2 % (ref 0–1.5)
BILIRUB SERPL-MCNC: 0.2 MG/DL (ref 0–1.2)
BUN SERPL-MCNC: 14 MG/DL (ref 6–20)
BUN/CREAT SERPL: 15.6 (ref 7–25)
CALCIUM SPEC-SCNC: 9.6 MG/DL (ref 8.6–10.5)
CHLORIDE SERPL-SCNC: 101 MMOL/L (ref 98–107)
CO2 SERPL-SCNC: 24 MMOL/L (ref 22–29)
CREAT SERPL-MCNC: 0.9 MG/DL (ref 0.57–1)
CRP SERPL-MCNC: 0.18 MG/DL (ref 0–0.5)
DEPRECATED RDW RBC AUTO: 40.4 FL (ref 37–54)
EOSINOPHIL # BLD AUTO: 0.01 10*3/MM3 (ref 0–0.4)
EOSINOPHIL NFR BLD AUTO: 0.1 % (ref 0.3–6.2)
ERYTHROCYTE [DISTWIDTH] IN BLOOD BY AUTOMATED COUNT: 12.9 % (ref 12.3–15.4)
FERRITIN SERPL-MCNC: 63.35 NG/ML (ref 13–150)
FLUAV RNA RESP QL NAA+PROBE: NOT DETECTED
FLUBV RNA RESP QL NAA+PROBE: NOT DETECTED
GFR SERPL CREATININE-BSD FRML MDRD: 71 ML/MIN/1.73
GLOBULIN UR ELPH-MCNC: 3.4 GM/DL
GLUCOSE SERPL-MCNC: 154 MG/DL (ref 65–99)
HCT VFR BLD AUTO: 39.6 % (ref 34–46.6)
HGB BLD-MCNC: 13.4 G/DL (ref 12–15.9)
HOLD SPECIMEN: NORMAL
IMM GRANULOCYTES # BLD AUTO: 0.06 10*3/MM3 (ref 0–0.05)
IMM GRANULOCYTES NFR BLD AUTO: 0.4 % (ref 0–0.5)
LDH SERPL-CCNC: 148 U/L (ref 135–214)
LYMPHOCYTES # BLD AUTO: 1.99 10*3/MM3 (ref 0.7–3.1)
LYMPHOCYTES NFR BLD AUTO: 13.1 % (ref 19.6–45.3)
MCH RBC QN AUTO: 29.6 PG (ref 26.6–33)
MCHC RBC AUTO-ENTMCNC: 33.8 G/DL (ref 31.5–35.7)
MCV RBC AUTO: 87.4 FL (ref 79–97)
MONOCYTES # BLD AUTO: 0.59 10*3/MM3 (ref 0.1–0.9)
MONOCYTES NFR BLD AUTO: 3.9 % (ref 5–12)
NEUTROPHILS NFR BLD AUTO: 12.48 10*3/MM3 (ref 1.7–7)
NEUTROPHILS NFR BLD AUTO: 82.3 % (ref 42.7–76)
NRBC BLD AUTO-RTO: 0 /100 WBC (ref 0–0.2)
PLATELET # BLD AUTO: 395 10*3/MM3 (ref 140–450)
PMV BLD AUTO: 9.7 FL (ref 6–12)
POTASSIUM SERPL-SCNC: 3.8 MMOL/L (ref 3.5–5.2)
PROCALCITONIN SERPL-MCNC: <0.02 NG/ML (ref 0–0.25)
PROT SERPL-MCNC: 7.6 G/DL (ref 6–8.5)
RBC # BLD AUTO: 4.53 10*6/MM3 (ref 3.77–5.28)
SARS-COV-2 RNA RESP QL NAA+PROBE: NOT DETECTED
SODIUM SERPL-SCNC: 137 MMOL/L (ref 136–145)
WBC # BLD AUTO: 15.16 10*3/MM3 (ref 3.4–10.8)
WHOLE BLOOD HOLD SPECIMEN: NORMAL

## 2021-01-10 PROCEDURE — 71045 X-RAY EXAM CHEST 1 VIEW: CPT

## 2021-01-10 PROCEDURE — 99283 EMERGENCY DEPT VISIT LOW MDM: CPT

## 2021-01-10 PROCEDURE — 82728 ASSAY OF FERRITIN: CPT | Performed by: FAMILY MEDICINE

## 2021-01-10 PROCEDURE — 83615 LACTATE (LD) (LDH) ENZYME: CPT | Performed by: FAMILY MEDICINE

## 2021-01-10 PROCEDURE — 80053 COMPREHEN METABOLIC PANEL: CPT | Performed by: FAMILY MEDICINE

## 2021-01-10 PROCEDURE — 84145 PROCALCITONIN (PCT): CPT | Performed by: FAMILY MEDICINE

## 2021-01-10 PROCEDURE — 86140 C-REACTIVE PROTEIN: CPT | Performed by: FAMILY MEDICINE

## 2021-01-10 PROCEDURE — 87636 SARSCOV2 & INF A&B AMP PRB: CPT | Performed by: FAMILY MEDICINE

## 2021-01-10 PROCEDURE — 85025 COMPLETE CBC W/AUTO DIFF WBC: CPT | Performed by: FAMILY MEDICINE

## 2021-01-10 RX ORDER — BENZONATATE 200 MG/1
200 CAPSULE ORAL 3 TIMES DAILY PRN
Qty: 30 CAPSULE | Refills: 0 | Status: SHIPPED | OUTPATIENT
Start: 2021-01-10 | End: 2021-01-14

## 2021-01-10 RX ORDER — AZITHROMYCIN 250 MG/1
TABLET, FILM COATED ORAL
Qty: 6 TABLET | Refills: 0 | Status: SHIPPED | OUTPATIENT
Start: 2021-01-10 | End: 2021-01-14

## 2021-01-10 NOTE — ED PROVIDER NOTES
Subjective     History provided by:  Patient   used: No    Patient is a 35 years old who presented here today because of cough and congestion and shortness of breath and total body ache that started about 3 days ago and progressively getting worse.  Denies any fever chills or sweating.  She is not sure whether she is exposed to someone that is sick.  Also, some weakness but no change in taste or smell.    Review of Systems   Respiratory: Positive for cough, chest tightness and shortness of breath.    All other systems reviewed and are negative.      Past Medical History:   Diagnosis Date   • Abnormal weight gain    • Acquired keratosis pilaris    • Adult BMI > 30    • Anemia    • Bunion    • Dysuria    • KATHY (generalized anxiety disorder)    • Headache    • Low back pain    • Need for immunization against influenza    • Rash    • Recurrent major depression (CMS/HCC)    • Spasm of back muscles    • Trochanteric tendinitis     bursitis   • Urinary tract infection        No Known Allergies    Past Surgical History:   Procedure Laterality Date   •  SECTION      x 3   • CHOLECYSTECTOMY WITH INTRAOPERATIVE CHOLANGIOGRAM N/A 2017    Procedure: LAPAROSCOPIC CHOLECYSTECTOMY WITH CHOLANGIOGRAM  (C-ARM#2);  Surgeon: Luis Cummins MD;  Location: [facility];  Service:    • HYSTEROSCOPY  2009   • INJECTION OF MEDICATION  2016    kenalog   • INJECTION OF MEDICATION  2016    toradol   • INJECTION OF MEDICATION  2016    zofran   • LAPAROSCOPY DIAGNOSTIC / BIOPSY / ASPIRATION / LYSIS  2013   • PAP SMEAR  2012   • SALPINGO OOPHORECTOMY  2013   • SALPINGO OOPHORECTOMY Left     right ovary intact   • TOTAL ABDOMINAL HYSTERECTOMY  2014       Family History   Problem Relation Age of Onset   • Depression Mother    • Diabetes Mother    • Breast cancer Maternal Grandmother    • Coronary artery disease Maternal Grandmother    • Diabetes Maternal Grandmother     • Hypertension Maternal Grandmother    • Breast cancer Other    • Cancer Paternal Grandmother    • Hypertension Paternal Grandfather        Social History     Socioeconomic History   • Marital status: Single     Spouse name: Not on file   • Number of children: Not on file   • Years of education: Not on file   • Highest education level: Not on file   Tobacco Use   • Smoking status: Former Smoker     Types: Cigarettes     Quit date:      Years since quittin.0   • Smokeless tobacco: Never Used   Substance and Sexual Activity   • Alcohol use: No   • Drug use: No   • Sexual activity: Yes     Partners: Male     Birth control/protection: Surgical           Objective   Physical Exam  Vitals signs and nursing note reviewed.   Constitutional:       Appearance: Normal appearance. She is obese.   HENT:      Head: Normocephalic and atraumatic.      Right Ear: Tympanic membrane, ear canal and external ear normal.      Left Ear: Tympanic membrane, ear canal and external ear normal.      Nose: Nose normal.   Eyes:      Extraocular Movements: Extraocular movements intact.      Pupils: Pupils are equal, round, and reactive to light.   Neck:      Musculoskeletal: Normal range of motion and neck supple.   Cardiovascular:      Rate and Rhythm: Normal rate and regular rhythm.      Pulses: Normal pulses.      Heart sounds: Normal heart sounds.   Pulmonary:      Effort: Pulmonary effort is normal.      Breath sounds: Normal breath sounds.   Abdominal:      General: Abdomen is flat. Bowel sounds are normal.      Palpations: Abdomen is soft.   Musculoskeletal: Normal range of motion.   Skin:     General: Skin is warm.      Capillary Refill: Capillary refill takes less than 2 seconds.   Neurological:      General: No focal deficit present.      Mental Status: She is alert and oriented to person, place, and time.   Psychiatric:         Mood and Affect: Mood normal.         Behavior: Behavior normal.         Thought Content: Thought  "content normal.         Judgment: Judgment normal.         Procedures           ED Course  ED Course as of Santi 10 1332   Sun Santi 10, 2021   1331 Result was discussed patient.  Told to follow with primary care in 3 days.  Come back to ER symptoms get worse.    [MO]      ED Course User Index  [MO] Jason Bowles MD      Labs Reviewed   COMPREHENSIVE METABOLIC PANEL - Abnormal; Notable for the following components:       Result Value    Glucose 154 (*)     All other components within normal limits    Narrative:     GFR Normal >60  Chronic Kidney Disease <60  Kidney Failure <15     CBC WITH AUTO DIFFERENTIAL - Abnormal; Notable for the following components:    WBC 15.16 (*)     Neutrophil % 82.3 (*)     Lymphocyte % 13.1 (*)     Monocyte % 3.9 (*)     Eosinophil % 0.1 (*)     Neutrophils, Absolute 12.48 (*)     Immature Grans, Absolute 0.06 (*)     All other components within normal limits   COVID-19 AND FLU A/B, NP SWAB IN TRANSPORT MEDIA 8-12 HR TAT - Normal    Narrative:     Fact sheet for providers: https://www.fda.gov/media/493736/download    Fact sheet for patients: https://www.fda.gov/media/682948/download    Test performed by PCR.   LACTATE DEHYDROGENASE - Normal   PROCALCITONIN - Normal    Narrative:     As a Marker for Sepsis (Non-Neonates):   1. <0.5 ng/mL represents a low risk of severe sepsis and/or septic shock.  1. >2 ng/mL represents a high risk of severe sepsis and/or septic shock.    As a Marker for Lower Respiratory Tract Infections that require antibiotic therapy:  PCT on Admission     Antibiotic Therapy             6-12 Hrs later  > 0.5                Strongly Recommended            >0.25 - <0.5         Recommended  0.1 - 0.25           Discouraged                   Remeasure/reassess PCT  <0.1                 Strongly Discouraged          Remeasure/reassess PCT      As 28 day mortality risk marker: \"Change in Procalcitonin Result\" (> 80 % or <=80 %) if Day 0 (or Day 1) and Day 4 values are " available. Refer to http://www.[facility]-pct-calculator.com/   Change in PCT <=80 %   A decrease of PCT levels below or equal to 80 % defines a positive change in PCT test result representing a higher risk for 28-day all-cause mortality of patients diagnosed with severe sepsis or septic shock.  Change in PCT > 80 %   A decrease of PCT levels of more than 80 % defines a negative change in PCT result representing a lower risk for 28-day all-cause mortality of patients diagnosed with severe sepsis or septic shock.                Results may be falsely decreased if patient taking Biotin.    C-REACTIVE PROTEIN - Normal   FERRITIN - Normal    Narrative:     Results may be falsely decreased if patient taking Biotin.     CBC AND DIFFERENTIAL    Narrative:     The following orders were created for panel order CBC & Differential.  Procedure                               Abnormality         Status                     ---------                               -----------         ------                     CBC Auto Differential[[id number]]        Abnormal            Final result                 Please view results for these tests on the individual orders.   EXTRA TUBES    Narrative:     The following orders were created for panel order Extra Tubes.  Procedure                               Abnormality         Status                     ---------                               -----------         ------                     Light Blue Top[[id number]]                                   Final result               Gold Top - SST[[id number]]                                   Final result                 Please view results for these tests on the individual orders.   LIGHT BLUE TOP   GOLD TOP - SST       XR Chest 1 View   Final Result   Impression:       There is presence of minimal right basilar   infiltrate/atelectasis.      Electronically signed by:  Gabriel Abraham MD  1/10/2021 1:17 PM [facility]   Workstation: [id number]                                            MDM    Final diagnoses:   Pneumonia due to infectious organism, unspecified laterality, unspecified part of lung            Jason Bowles MD  01/10/21 9217

## 2021-01-14 ENCOUNTER — OFFICE VISIT (OUTPATIENT)
Dept: CARDIOLOGY | Facility: CLINIC | Age: 36
End: 2021-01-14

## 2021-01-14 VITALS
BODY MASS INDEX: 35.52 KG/M2 | DIASTOLIC BLOOD PRESSURE: 76 MMHG | OXYGEN SATURATION: 99 % | SYSTOLIC BLOOD PRESSURE: 124 MMHG | HEART RATE: 116 BPM | HEIGHT: 69 IN | WEIGHT: 239.8 LBS

## 2021-01-14 DIAGNOSIS — J18.9 PNEUMONIA OF RIGHT LOWER LOBE DUE TO INFECTIOUS ORGANISM: ICD-10-CM

## 2021-01-14 DIAGNOSIS — R07.2 PRECORDIAL PAIN: Primary | ICD-10-CM

## 2021-01-14 PROCEDURE — 99214 OFFICE O/P EST MOD 30 MIN: CPT | Performed by: NURSE PRACTITIONER

## 2021-01-14 RX ORDER — ALBUTEROL SULFATE 90 UG/1
AEROSOL, METERED RESPIRATORY (INHALATION)
COMMUNITY
Start: 2021-01-10 | End: 2021-07-12 | Stop reason: SDUPTHER

## 2021-01-14 NOTE — PROGRESS NOTES
[facility] (Ref: Linda) and Shortness of Breath      History of Present Illness     Iris Alan is a 35-year-old  female with past medical history of cervical spine pain, KATHY and depression.  Currently does not take any medications on a daily basis.  She presents today for an evaluation of chest pain and shortness of breath. She tested positive for Covid back in November reports having these issues since then.  Reports continued complaints of intermittent nonproductive cough, shortness of breath and intermittent chest pain.  Patient was started on  symptomatic medications including albuterol inhaler, cough medicine and other supportive therapy, instructed to quarantine at home.  No hypoxia was noted.  Chest x-ray showed no active disease at that time.  EKG on November 28, 2020 showed normal sinus rhythm with a heart rate of 90 bpm, nonspecific T wave abnormality in lead III and aVL without inversion.  No acute ST-T wave changes.  No evidence of acute ischemia. EKG repeated today and unchanged. She was seen again in the ER on 1-10-21 for complaints of cough, congestion, shortness of breath.  Chest x-ray appears to have worsened and shows presence of minimal right basilar infiltrate/atelectasis.  She was diagnosed with pneumonia and sent home with azithromycin and Tessalon Perles.  Covid test negative.  CBC was elevated at 15.16.  Ferritin, CRP, LDH and procalcitonin negative.  CMP unremarkable.    In regards to her chest pain she mentions about 1-1/2 weeks ago having one episode of central substernal chest pain which was sharp and woke her up from her sleep.  Reports that lasting a couple seconds and then spontaneously resolved.  Associated symptoms of shortness of breath and cough.  She denies exertional chest pain, leg edema, PND, orthopnea or palpitations.  She denies previous cardiac history or previous cardiac work-up.  She denies previous history of DVT or PE.    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  Denies tobacco use, drug use or alcohol use.        Past Medical History:   Diagnosis Date   • Abnormal weight gain    • Acquired keratosis pilaris    • Adult BMI > 30    • Anemia    • Bunion    • Dysuria    • KATHY (generalized anxiety disorder)    • Headache    • Low back pain    • Need for immunization against influenza    • Rash    • Recurrent major depression (CMS/HCC)    • Spasm of back muscles    • Trochanteric tendinitis     bursitis   • Urinary tract infection      Past Surgical History:   Procedure Laterality Date   •  SECTION      x 3   • CHOLECYSTECTOMY WITH INTRAOPERATIVE CHOLANGIOGRAM N/A 2017    Procedure: LAPAROSCOPIC CHOLECYSTECTOMY WITH CHOLANGIOGRAM  (C-ARM#2);  Surgeon: Luis Cummins MD;  Location: [facility];  Service:    • HYSTEROSCOPY  2009   • INJECTION OF MEDICATION  2016    kenalog   • INJECTION OF MEDICATION  2016    toradol   • INJECTION OF MEDICATION  2016    zofran   • LAPAROSCOPY DIAGNOSTIC / BIOPSY / ASPIRATION / LYSIS  2013   • PAP SMEAR  2012   • SALPINGO OOPHORECTOMY  2013   • SALPINGO OOPHORECTOMY Left     right ovary intact   • TOTAL ABDOMINAL HYSTERECTOMY  2014     Social History     Socioeconomic History   • Marital status: Single     Spouse name: Not on file   • Number of children: Not on file   • Years of education: Not on file   • Highest education level: Not on file   Tobacco Use   • Smoking status: Former Smoker     Types: Cigarettes     Quit date:      Years since quittin.0   • Smokeless tobacco: Never Used   Substance and Sexual Activity   • Alcohol use: No   • Drug use: No   • Sexual activity: Yes     Partners: Male     Birth control/protection: Surgical     Family History   Problem Relation Age of Onset   • Depression Mother    • Diabetes Mother    • Breast cancer Maternal Grandmother    • Coronary artery disease Maternal Grandmother    • Diabetes  Maternal Grandmother    • Hypertension Maternal Grandmother    • Breast cancer Other    • Cancer Paternal Grandmother    • Hypertension Paternal Grandfather        ALLERGIES:  No Known Allergies      Review of Systems   Constitution: Positive for malaise/fatigue. Negative for chills, fever and weight gain.   HENT: Negative for nosebleeds and tinnitus.    Eyes: Negative for blurred vision and double vision.   Cardiovascular: Positive for chest pain. Negative for irregular heartbeat, leg swelling, palpitations and syncope.   Respiratory: Positive for cough and shortness of breath. Negative for sleep disturbances due to breathing and snoring.    Endocrine: Negative for polydipsia, polyphagia and polyuria.   Hematologic/Lymphatic: Negative for bleeding problem. Does not bruise/bleed easily.   Skin: Negative for color change and suspicious lesions.   Musculoskeletal: Negative for falls and joint pain.   Gastrointestinal: Negative for bloating, heartburn and hematochezia.   Genitourinary: Negative for dysuria and hematuria.   Neurological: Negative for dizziness, headaches, seizures, vertigo and weakness.   Psychiatric/Behavioral: Negative for altered mental status and depression. The patient does not have insomnia and is not nervous/anxious.    Allergic/Immunologic: Negative for environmental allergies and persistent infections.       Current Outpatient Medications   Medication Sig Dispense Refill   • Ventolin  (90 Base) MCG/ACT inhaler        No current facility-administered medications for this visit.        OBJECTIVE:    Physical Exam:   Vitals signs reviewed.   Constitutional:       General: Not in acute distress.     Appearance: Normal appearance. Well-developed. Not toxic-appearing or diaphoretic.   Eyes:      General: Lids are normal.      Conjunctiva/sclera: Conjunctivae normal.   HENT:      Head: Normocephalic and atraumatic.      Right Ear: External ear normal.      Left Ear: External ear normal.   Neck:  "     Musculoskeletal: Normal range of motion and neck supple.      Vascular: No JVD.   Pulmonary:      Effort: Pulmonary effort is normal. No respiratory distress.      Breath sounds: Examination of the right-lower field reveals decreased breath sounds and rales. Decreased breath sounds present. No wheezing. Rales present.   Chest:      Chest wall: Not tender to palpatation.   Cardiovascular:      PMI at left midclavicular line. Normal rate. Regular rhythm. Normal S1 with normal intensity. Normal S2 with normal intensity.      Murmurs: There is no murmur.      No gallop. No S3 and S4 gallop. No click. No rub.   Pulses:     Intact distal pulses. No decreased pulses.   Edema:     Peripheral edema absent.   Abdominal:      General: Bowel sounds are normal. There is no distension.      Palpations: Abdomen is soft.      Tenderness: There is no abdominal tenderness.   Skin:     General: Skin is warm and dry.      Coloration: Skin is not pale.      Findings: No erythema or rash.   Neurological:      Mental Status: Alert and oriented to person, place, and time.      Gait: Gait normal.   Psychiatric:         Behavior: Behavior normal.         Thought Content: Thought content normal.         Judgment: Judgment normal.       Vitals:    01/14/21 0904   BP: 124/76   Pulse: 116   SpO2: 99%   Weight: 109 kg (239 lb 12.8 oz)   Height: 175.3 cm (69\")       DATA REVIEWED:        Us Non-ob Transvaginal    Result Date: 1/4/2021  Status post hysterectomy and left-sided ovarian removal. Right ovarian cyst as above Electronically signed by:  Moises Finch MD  1/4/2021 1:11 PM CST Workstation: [id number]    Xr Chest 1 View    Result Date: 1/10/2021  Impression: There is presence of minimal right basilar infiltrate/atelectasis. Electronically signed by:  Gabriel Abraham MD  1/10/2021 1:17 PM CST Workstation: [id number]    CXR:         CT:     Labs: BMP, CBC, LIPID, TSH  Lab Results   Component Value Date    GLUCOSE 154 (H) 01/10/2021    " CALCIUM 9.6 01/10/2021     01/10/2021    K 3.8 01/10/2021    CO2 24.0 01/10/2021     01/10/2021    BUN 14 01/10/2021    CREATININE 0.90 01/10/2021    EGFRIFNONA 71 01/10/2021    BCR 15.6 01/10/2021    ANIONGAP 12.0 01/10/2021     Lab Results   Component Value Date    WBC 15.16 (H) 01/10/2021    HGB 13.4 01/10/2021    HCT 39.6 01/10/2021    MCV 87.4 01/10/2021     01/10/2021     Lab Results   Component Value Date    CHOL 198 08/08/2019    CHOL 186 06/15/2018     Lab Results   Component Value Date    TRIG 158 (H) 08/08/2019    TRIG 139 06/15/2018     Lab Results   Component Value Date    HDL 54 08/08/2019    HDL 34 (L) 06/15/2018     No components found for: LDLCALC  Lab Results   Component Value Date     (H) 08/08/2019     06/15/2018     No results found for: HDLLDLRATIO  No components found for: CHOLHDL  Lab Results   Component Value Date    TSH 2.230 08/08/2019     No results found for: PROBNP  EKG:         TTE:     Stress tests:     LHC:      The following portions of the patient's history were reviewed and updated as appropriate: allergies, current medications, past family history, past medical history, past social history, past surgical history and problem list.  Old records reviewed and pertinent information is included in the above objective data.     ASSESSMENT/PLAN:       Diagnosis Plan   1. Precordial pain  ECG 12 Lead    Adult Transthoracic Echo Complete W/ Cont if Necessary Per Protocol   2. Pneumonia of right lower lobe due to infectious organism         1. Chest pain syndrome: Pain characteristic: non cardiac chest pain, pleuritic   Patient is Minimal risk.     Ischemic evaluation:not indicated. As well patient is not able to exercise d/t SOA-pneu. Only one episode of CP x few secs. If symptoms continue-- can consider stress testing.   EKG is normal sinus rhythm with a heart rate of 90 bpm, nonspecific T wave abnormality in lead III and aVL without inversion.  No acute  ST-T wave changes.  No evidence of acute ischemia. EKG repeated today and unchanged from previous.   TTE to assess for structural HD  Basic Labs, PA/LA CXR (results reviewed from both ER visits)    Reasons for non-cardiac chest pain:  Pneumonia  GERD  Esophageal Spasm  Musculoskeletal pain  Peptic Ulcer Disease  Cholecystitis  Pancreatitis  Symptomatic anemia  Vasoactive Drug Use  Anxiety  Mediastinitis     2. Pneumonia of RLL: recent covid-19 virus infection in Nov.  Feel like her symptoms are related to this.  Continue current treatment for pneumonia including completion of antibiotic, Tessalon Perles or cough syrup.  Discussed supportive therapy including hot showers, humidifier, vitamins.  Incentive spirometer.      Follow up: 1 month              This document has been electronically signed by LEN Thurston on January 14, 2021 11:30 CST

## 2021-03-25 ENCOUNTER — TRANSCRIBE ORDERS (OUTPATIENT)
Dept: LAB | Facility: HOSPITAL | Age: 36
End: 2021-03-25

## 2021-03-25 ENCOUNTER — LAB (OUTPATIENT)
Dept: LAB | Facility: HOSPITAL | Age: 36
End: 2021-03-25

## 2021-03-25 DIAGNOSIS — Z79.899 ENCOUNTER FOR LONG-TERM (CURRENT) USE OF HIGH-RISK MEDICATION: Primary | ICD-10-CM

## 2021-03-25 DIAGNOSIS — Z79.899 ENCOUNTER FOR LONG-TERM (CURRENT) USE OF HIGH-RISK MEDICATION: ICD-10-CM

## 2021-03-25 LAB
ALT SERPL W P-5'-P-CCNC: 18 U/L (ref 1–33)
AST SERPL-CCNC: 19 U/L (ref 1–32)
TRIGL SERPL-MCNC: 71 MG/DL (ref 0–150)

## 2021-03-25 PROCEDURE — 84460 ALANINE AMINO (ALT) (SGPT): CPT

## 2021-03-25 PROCEDURE — 84450 TRANSFERASE (AST) (SGOT): CPT

## 2021-03-25 PROCEDURE — 36415 COLL VENOUS BLD VENIPUNCTURE: CPT

## 2021-03-25 PROCEDURE — 84478 ASSAY OF TRIGLYCERIDES: CPT

## 2021-04-06 ENCOUNTER — OFFICE VISIT (OUTPATIENT)
Dept: ORTHOPEDIC SURGERY | Facility: CLINIC | Age: 36
End: 2021-04-06

## 2021-04-06 VITALS — HEIGHT: 69 IN | BODY MASS INDEX: 37.03 KG/M2 | WEIGHT: 250 LBS

## 2021-04-06 DIAGNOSIS — G89.29 CHRONIC PAIN OF LEFT THUMB: Primary | ICD-10-CM

## 2021-04-06 DIAGNOSIS — M65.4 DE QUERVAIN'S DISEASE (TENOSYNOVITIS): ICD-10-CM

## 2021-04-06 DIAGNOSIS — M79.644 PAIN OF RIGHT THUMB: ICD-10-CM

## 2021-04-06 DIAGNOSIS — M79.645 CHRONIC PAIN OF LEFT THUMB: Primary | ICD-10-CM

## 2021-04-06 DIAGNOSIS — M79.645 THUMB PAIN, LEFT: Primary | ICD-10-CM

## 2021-04-06 PROCEDURE — 20550 NJX 1 TENDON SHEATH/LIGAMENT: CPT | Performed by: NURSE PRACTITIONER

## 2021-04-06 PROCEDURE — 99213 OFFICE O/P EST LOW 20 MIN: CPT | Performed by: NURSE PRACTITIONER

## 2021-04-06 RX ORDER — METHYLPREDNISOLONE 4 MG/1
TABLET ORAL
Qty: 21 TABLET | Refills: 0 | Status: SHIPPED | OUTPATIENT
Start: 2021-04-06 | End: 2021-04-13

## 2021-04-06 RX ORDER — MELOXICAM 15 MG/1
15 TABLET ORAL DAILY
Qty: 14 TABLET | Refills: 0 | Status: SHIPPED | OUTPATIENT
Start: 2021-04-06 | End: 2021-04-20

## 2021-04-06 NOTE — PROGRESS NOTES
Iris Alan is a 35 y.o. female   Primary provider:  Brenda García MD       Chief Complaint   Patient presents with   • Left Hand - Pain     thumb       HISTORY OF PRESENT ILLNESS: Patient is a 35-year-old female who presents today with complaints of left thumb pain that has been present for approximately 2 months.  Patient reports the pain is intermittent but constant when it does occur.  Pain is moderate to severe.  She describes the pain as a stabbing and aching quality.  It is associated with clicking and swelling.  She is left-hand dominant.  Patient reports that she works as a  and must do repetitive movements with her thumb.  She denies injury.  She has tried ibuprofen and ice without relief of symptoms.  She rates her pain is a 4 out of 10 however this increases with certain movements.  She denies fever, nausea, vomiting.  She denies red joints.  She has been sent for x-rays today.  She reports there is no chance she could be pregnant.    Patient reports that she has began having similar symptoms in her right hand, though they are not quite as bad as her left at this point.    Pain  This is a chronic problem. The current episode started more than 1 month ago. The problem occurs constantly. The problem has been unchanged. Associated symptoms include joint swelling. Associated symptoms comments: Stabbing, aching, clicking, swelling. . Treatments tried: xrays done today.        CONCURRENT MEDICAL HISTORY:    Past Medical History:   Diagnosis Date   • Abnormal weight gain    • Acquired keratosis pilaris    • Adult BMI > 30    • Anemia    • Bunion    • Dysuria    • KATHY (generalized anxiety disorder)    • Headache    • Low back pain    • Need for immunization against influenza    • Rash    • Recurrent major depression (CMS/HCC)    • Spasm of back muscles    • Trochanteric tendinitis     bursitis   • Urinary tract infection        No Known Allergies      Current Outpatient Medications:   •   Ventolin  (90 Base) MCG/ACT inhaler, , Disp: , Rfl:   •  benzonatate (TESSALON) 200 MG capsule, Take 200 mg by mouth 3 (Three) Times a Day As Needed for Cough., Disp: , Rfl:   •  budesonide (Pulmicort Flexhaler) 90 MCG/ACT inhaler, Inhale 1 puff 2 (Two) Times a Day for 7 days., Disp: 1 each, Rfl: 0  •  meloxicam (Mobic) 15 MG tablet, Take 1 tablet by mouth Daily for 14 days., Disp: 14 tablet, Rfl: 0  •  methylPREDNISolone (MEDROL) 4 MG dose pack, Use as directed by package instructions, Disp: 21 tablet, Rfl: 0  •  promethazine-dextromethorphan (PROMETHAZINE-DM) 6.25-15 MG/5ML syrup, Take 5 mL by mouth Every 6 (Six) Hours As Needed for Cough., Disp: 240 mL, Rfl: 0    Past Surgical History:   Procedure Laterality Date   •  SECTION      x 3   • CHOLECYSTECTOMY WITH INTRAOPERATIVE CHOLANGIOGRAM N/A 2017    Procedure: LAPAROSCOPIC CHOLECYSTECTOMY WITH CHOLANGIOGRAM  (C-ARM#2);  Surgeon: Luis Cummins MD;  Location: [facility];  Service:    • HYSTEROSCOPY  2009   • INJECTION OF MEDICATION  2016    kenalog   • INJECTION OF MEDICATION  2016    toradol   • INJECTION OF MEDICATION  2016    zofran   • LAPAROSCOPY DIAGNOSTIC / BIOPSY / ASPIRATION / LYSIS  2013   • PAP SMEAR  2012   • SALPINGO OOPHORECTOMY  2013   • SALPINGO OOPHORECTOMY Left     right ovary intact   • TOTAL ABDOMINAL HYSTERECTOMY  2014       Family History   Problem Relation Age of Onset   • Depression Mother    • Diabetes Mother    • Breast cancer Maternal Grandmother    • Coronary artery disease Maternal Grandmother    • Diabetes Maternal Grandmother    • Hypertension Maternal Grandmother    • Breast cancer Other    • Cancer Paternal Grandmother    • Hypertension Paternal Grandfather         Social History     Socioeconomic History   • Marital status: Single     Spouse name: Not on file   • Number of children: Not on file   • Years of education: Not on file   • Highest education level:  "Not on file   Tobacco Use   • Smoking status: Former Smoker     Types: Cigarettes     Quit date:      Years since quittin.2   • Smokeless tobacco: Never Used   Substance and Sexual Activity   • Alcohol use: No   • Drug use: No   • Sexual activity: Yes     Partners: Male     Birth control/protection: Surgical        Review of Systems   Musculoskeletal: Positive for joint swelling.        Joint pain   All other systems reviewed and are negative.      PHYSICAL EXAMINATION:       Ht 175.3 cm (69\")   Wt 113 kg (250 lb)   LMP  (LMP Unknown)   BMI 36.92 kg/m²     Physical Exam  Vitals and nursing note reviewed.   Constitutional:       General: She is not in acute distress.     Appearance: She is well-developed. She is not toxic-appearing.   HENT:      Head: Normocephalic.   Pulmonary:      Effort: Pulmonary effort is normal. No respiratory distress.   Skin:     General: Skin is warm and dry.   Neurological:      Mental Status: She is alert and oriented to person, place, and time.   Psychiatric:         Behavior: Behavior normal.         Thought Content: Thought content normal.         Judgment: Judgment normal.         GAIT:     [x]  Normal  []  Antalgic    Assistive device: [x]  None  []  Walker     []  Crutches  []  Cane     []  Wheelchair  []  Stretcher    Right Hand Exam     Tenderness   The patient is experiencing no tenderness.     Range of Motion   The patient has normal right wrist ROM.     Tests   Finkelstein's test: negative    Other   Erythema: absent  Sensation: normal  Pulse: present      Left Hand Exam     Tenderness   The patient is experiencing tenderness in the radial area.     Range of Motion   The patient has normal left wrist ROM.    Tests   Finkelstein's test: positive    Other   Erythema: absent  Sensation: normal  Pulse: present              XR Hand 3+ View Left    Result Date: 2021  Narrative: Study: XR hand 3+ view, left Comparison: None Narrative: Alignment and position of left hand " is acceptable.  Joint spaces are maintained.  No acute bony abnormality identified.  Soft tissues unremarkable. Christa Arnold APRN 4/6/2021       Small Joint Arthrocentesis: L thumb CMC  Consent given by: patient  Site marked: site marked  Timeout: Immediately prior to procedure a time out was called to verify the correct patient, procedure, equipment, support staff and site/side marked as required   Supporting Documentation  Indications: pain   Procedure Details  Location: thumb - L thumb CMC  Needle size: 26 G  Approach: plantar  Medication group details: 0.3 cc lidocaine ndc # 88716-817-50 lot # [id number]  0.3 cc triamcinolone acetonide  ndc # [address]-5 lot # [id number].  Patient tolerance: patient tolerated the procedure well with no immediate complications          ASSESSMENT:    Diagnoses and all orders for this visit:    Thumb pain, left  -     Small Joint Arthrocentesis: L thumb CMC    De Quervain's disease (tenosynovitis)  -     Small Joint Arthrocentesis: L thumb CMC    Pain of right thumb    Other orders  -     methylPREDNISolone (MEDROL) 4 MG dose pack; Use as directed by package instructions  -     meloxicam (Mobic) 15 MG tablet; Take 1 tablet by mouth Daily for 14 days.          PLAN    X-rays reviewed, no acute bony abnormality identified.  Based on patient's reports of overuse, area of pain, and positive Finkelstein today diagnosis of de Quervain's made.  Available treatment options including p.o./injectable steroids, thumb spica, prescription strength NSAIDs explained to patient.  After considering options patient desires to proceed with localized injection today.  Risk, benefits, alternatives were explained prior to injection, patient tolerated injection well.  Patient placed in left thumb spica, patient to wear this for the next 1 to 2 weeks, patient may come out of thumb spica to do gentle range of motion.  Patient also prescribed prescription strength NSAID, how to take medication properly  explained.  Patient verbalized understanding.  Medrol Dosepak also prescribed to give additional benefit to left thumb but also provide some benefit to right in hopes of treating mild de Quervain's before it becomes significant.  Patient verbalized understanding of instructions and is to return in 2 weeks for recheck or sooner if needed.    Return in about 2 weeks (around 4/20/2021).    Christa Arnold, APRN

## 2021-04-13 ENCOUNTER — OFFICE VISIT (OUTPATIENT)
Dept: FAMILY MEDICINE CLINIC | Facility: CLINIC | Age: 36
End: 2021-04-13

## 2021-04-13 VITALS
HEIGHT: 69 IN | DIASTOLIC BLOOD PRESSURE: 88 MMHG | BODY MASS INDEX: 36.14 KG/M2 | OXYGEN SATURATION: 98 % | SYSTOLIC BLOOD PRESSURE: 120 MMHG | WEIGHT: 244 LBS | HEART RATE: 93 BPM

## 2021-04-13 DIAGNOSIS — Z86.16 HISTORY OF COVID-19: ICD-10-CM

## 2021-04-13 DIAGNOSIS — Z76.89 ENCOUNTER TO ESTABLISH CARE: ICD-10-CM

## 2021-04-13 DIAGNOSIS — Z00.00 ANNUAL PHYSICAL EXAM: Primary | ICD-10-CM

## 2021-04-13 DIAGNOSIS — R06.02 SHORTNESS OF BREATH: ICD-10-CM

## 2021-04-13 DIAGNOSIS — Z11.59 NEED FOR HEPATITIS C SCREENING TEST: ICD-10-CM

## 2021-04-13 PROCEDURE — 99213 OFFICE O/P EST LOW 20 MIN: CPT | Performed by: FAMILY MEDICINE

## 2021-04-13 RX ORDER — ISOTRETINOIN 40 MG/1
CAPSULE ORAL
COMMUNITY
End: 2021-08-16

## 2021-04-15 ENCOUNTER — LAB (OUTPATIENT)
Dept: LAB | Facility: HOSPITAL | Age: 36
End: 2021-04-15

## 2021-04-15 DIAGNOSIS — Z11.59 NEED FOR HEPATITIS C SCREENING TEST: ICD-10-CM

## 2021-04-15 DIAGNOSIS — Z00.00 ANNUAL PHYSICAL EXAM: ICD-10-CM

## 2021-04-15 PROCEDURE — 86803 HEPATITIS C AB TEST: CPT

## 2021-04-15 PROCEDURE — 85025 COMPLETE CBC W/AUTO DIFF WBC: CPT

## 2021-04-15 PROCEDURE — 80053 COMPREHEN METABOLIC PANEL: CPT

## 2021-04-15 PROCEDURE — 80061 LIPID PANEL: CPT

## 2021-04-15 PROCEDURE — 36415 COLL VENOUS BLD VENIPUNCTURE: CPT

## 2021-04-16 LAB
ALBUMIN SERPL-MCNC: 4.5 G/DL (ref 3.5–5.2)
ALBUMIN/GLOB SERPL: 1.7 G/DL
ALP SERPL-CCNC: 72 U/L (ref 39–117)
ALT SERPL W P-5'-P-CCNC: 21 U/L (ref 1–33)
ANION GAP SERPL CALCULATED.3IONS-SCNC: 10.3 MMOL/L (ref 5–15)
AST SERPL-CCNC: 23 U/L (ref 1–32)
BASOPHILS # BLD AUTO: 0.05 10*3/MM3 (ref 0–0.2)
BASOPHILS NFR BLD AUTO: 0.6 % (ref 0–1.5)
BILIRUB SERPL-MCNC: 0.2 MG/DL (ref 0–1.2)
BUN SERPL-MCNC: 15 MG/DL (ref 6–20)
BUN/CREAT SERPL: 18.5 (ref 7–25)
CALCIUM SPEC-SCNC: 9.7 MG/DL (ref 8.6–10.5)
CHLORIDE SERPL-SCNC: 100 MMOL/L (ref 98–107)
CHOLEST SERPL-MCNC: 197 MG/DL (ref 0–200)
CO2 SERPL-SCNC: 28.7 MMOL/L (ref 22–29)
CREAT SERPL-MCNC: 0.81 MG/DL (ref 0.57–1)
DEPRECATED RDW RBC AUTO: 40 FL (ref 37–54)
EOSINOPHIL # BLD AUTO: 0.11 10*3/MM3 (ref 0–0.4)
EOSINOPHIL NFR BLD AUTO: 1.4 % (ref 0.3–6.2)
ERYTHROCYTE [DISTWIDTH] IN BLOOD BY AUTOMATED COUNT: 12.6 % (ref 12.3–15.4)
GFR SERPL CREATININE-BSD FRML MDRD: 80 ML/MIN/1.73
GLOBULIN UR ELPH-MCNC: 2.7 GM/DL
GLUCOSE SERPL-MCNC: 87 MG/DL (ref 65–99)
HCT VFR BLD AUTO: 39.3 % (ref 34–46.6)
HCV AB SER DONR QL: NORMAL
HDLC SERPL-MCNC: 47 MG/DL (ref 40–60)
HGB BLD-MCNC: 13.3 G/DL (ref 12–15.9)
IMM GRANULOCYTES # BLD AUTO: 0.05 10*3/MM3 (ref 0–0.05)
IMM GRANULOCYTES NFR BLD AUTO: 0.6 % (ref 0–0.5)
LDLC SERPL CALC-MCNC: 121 MG/DL (ref 0–100)
LDLC/HDLC SERPL: 2.49 {RATIO}
LYMPHOCYTES # BLD AUTO: 2.96 10*3/MM3 (ref 0.7–3.1)
LYMPHOCYTES NFR BLD AUTO: 38.1 % (ref 19.6–45.3)
MCH RBC QN AUTO: 29.6 PG (ref 26.6–33)
MCHC RBC AUTO-ENTMCNC: 33.8 G/DL (ref 31.5–35.7)
MCV RBC AUTO: 87.3 FL (ref 79–97)
MONOCYTES # BLD AUTO: 0.54 10*3/MM3 (ref 0.1–0.9)
MONOCYTES NFR BLD AUTO: 7 % (ref 5–12)
NEUTROPHILS NFR BLD AUTO: 4.05 10*3/MM3 (ref 1.7–7)
NEUTROPHILS NFR BLD AUTO: 52.3 % (ref 42.7–76)
NRBC BLD AUTO-RTO: 0 /100 WBC (ref 0–0.2)
PLATELET # BLD AUTO: 383 10*3/MM3 (ref 140–450)
PMV BLD AUTO: 9.7 FL (ref 6–12)
POTASSIUM SERPL-SCNC: 4.6 MMOL/L (ref 3.5–5.2)
PROT SERPL-MCNC: 7.2 G/DL (ref 6–8.5)
RBC # BLD AUTO: 4.5 10*6/MM3 (ref 3.77–5.28)
SODIUM SERPL-SCNC: 139 MMOL/L (ref 136–145)
TRIGL SERPL-MCNC: 166 MG/DL (ref 0–150)
VLDLC SERPL-MCNC: 29 MG/DL (ref 5–40)
WBC # BLD AUTO: 7.76 10*3/MM3 (ref 3.4–10.8)

## 2021-04-20 ENCOUNTER — TELEPHONE (OUTPATIENT)
Dept: FAMILY MEDICINE CLINIC | Facility: CLINIC | Age: 36
End: 2021-04-20

## 2021-04-20 ENCOUNTER — OFFICE VISIT (OUTPATIENT)
Dept: ORTHOPEDIC SURGERY | Facility: CLINIC | Age: 36
End: 2021-04-20

## 2021-04-20 VITALS — BODY MASS INDEX: 36.14 KG/M2 | WEIGHT: 244 LBS | HEIGHT: 69 IN

## 2021-04-20 DIAGNOSIS — M79.645 CHRONIC PAIN OF LEFT THUMB: Primary | ICD-10-CM

## 2021-04-20 DIAGNOSIS — M79.645 THUMB PAIN, LEFT: ICD-10-CM

## 2021-04-20 DIAGNOSIS — G89.29 CHRONIC PAIN OF LEFT THUMB: Primary | ICD-10-CM

## 2021-04-20 PROCEDURE — 99213 OFFICE O/P EST LOW 20 MIN: CPT | Performed by: NURSE PRACTITIONER

## 2021-04-20 RX ORDER — BUDESONIDE AND FORMOTEROL FUMARATE DIHYDRATE 160; 4.5 UG/1; UG/1
2 AEROSOL RESPIRATORY (INHALATION) 2 TIMES DAILY
Qty: 1 EACH | Refills: 2 | Status: SHIPPED | OUTPATIENT
Start: 2021-04-20 | End: 2021-07-12

## 2021-04-20 NOTE — TELEPHONE ENCOUNTER
Please call let patient know the x-ray of the chest looks okay.  Hep C antibody done for screening was negative.  CMP is good.  CBC is normal.  Lipid panel shows slight elevation of LDL (bad) cholesterol.  Patient should work on healthy diet and increasing physical activity to improve her cholesterol numbers and keep her heart healthy.  ThanksKevin

## 2021-04-20 NOTE — PROGRESS NOTES
"Iris Alan is a 35 y.o. female      Chief Complaint   Patient presents with   • Left Hand - Follow-up       HISTORY OF PRESENT ILLNESS: Patient is a 35-year-old female who presents today with continued complaints of left thumb pain that has been present now for approximately 10 weeks.  Patient reports the pain is now nearly constant.  She reports the pain is moderate to severe.  She was treated at last appointment with Medrol Dosepak, Mobic, localized injection for de Quervain's, and thumb spica.  She reports that these interventions did not help at all and that her pain is no better and may even be somewhat worse.  She denies generalized joint pain/redness/swelling.  She denies known autoimmune disorders.  She denies nausea, fever, vomiting.  She reports that she has still been working but has been modifying her activity at work to where she does not have to use her thumb as much.  She reports clicking and popping.  She reports her pain is more at the base of her thumb this visit.  She continues to deny injury.  She reports intermittent swelling.  She reports it feels as if her thumb knots up and she is noticing that it is beginning to get worse in her right hand as well.  He believes this is secondary to having to use right hand more frequently as she has not able to use left.  She is left-hand dominant.       CONCURRENT MEDICAL HISTORY:    The following portions of the patient's history were reviewed and updated as appropriate: allergies, current medications, past family history, past medical history, past social history, past surgical history and problem list.     ROS  No fevers or chills.  No chest pain or shortness of air.  No GI or  disturbances.  Left thumb pain.    PHYSICAL EXAMINATION:       Ht 175.3 cm (69\")   Wt 111 kg (244 lb)   LMP  (LMP Unknown)   BMI 36.03 kg/m²     Physical Exam  Vitals and nursing note reviewed.   Constitutional:       General: She is not in acute distress.     " Appearance: She is well-developed. She is not toxic-appearing.   HENT:      Head: Normocephalic.   Pulmonary:      Effort: Pulmonary effort is normal. No respiratory distress.   Skin:     General: Skin is warm and dry.   Neurological:      Mental Status: She is alert and oriented to person, place, and time.   Psychiatric:         Behavior: Behavior normal.         Thought Content: Thought content normal.         Judgment: Judgment normal.         GAIT:     [x]  Normal  []  Antalgic    Assistive device: [x]  None  []  Walker     []  Crutches  []  Cane     []  Wheelchair  []  Stretcher    Right Hand Exam     Tenderness   The patient is experiencing no tenderness.     Range of Motion   The patient has normal right wrist ROM.     Tests   Phalen’s Sign: negative  Finkelstein's test: negative    Other   Erythema: absent  Sensation: normal  Pulse: present      Left Hand Exam     Tenderness   The patient is experiencing tenderness in the radial area.     Range of Motion   The patient has normal left wrist ROM.    Tests   Finkelstein's test: positive    Other   Erythema: absent  Sensation: normal  Pulse: present    Comments:  Patient is able to perform normal range of motion, however patient must do this slowly and patient reports pain with range of motion.  Mild swelling at base of thumb  No obvious deformity, bruising              XR Hand 3+ View Left    Result Date: 4/6/2021  Narrative: Study: XR hand 3+ view, left Comparison: None Narrative: Alignment and position of left hand is acceptable.  Joint spaces are maintained.  No acute bony abnormality identified.  Soft tissues unremarkable. Christa HARRINGTON 4/6/2021     XR Chest PA & Lateral    Result Date: 4/20/2021  Narrative: EXAM: XR CHEST PA AND LATERAL DATE: 4/20/2021 8:45 AM CDT TECHNIQUE: Two-view-PA and lateral radiographs of the chest CLINICAL INDICATION: History of COVID pnemonia 01/2021, continued shortness of breath and cough, Z86.16 Personal history of covid-19  R06.02 Shortness of breath COMPARISON: January 27, 2021 FINDINGS: The lungs are well-expanded and clear. The cardiac silhouette and pulmonary vasculature are within normal limits. There are no pleural effusions.     Impression: 1. No radiographic evidence of acute cardiopulmonary disease. Electronically signed by:  Jaimie Arroyo MD  4/20/2021 11:20 AM CDT Workstation: [id number]            ASSESSMENT:    Diagnoses and all orders for this visit:    Chronic pain of left thumb    Thumb pain, left  -     MRI hand left wo contrast; Future          PLAN      X-rays reviewed again, patient may have mild degenerative changes at CMC joint.  CMC joint is where patient's pain is more localized to today.  However patient did not get any better with thumb spica Mobic or Medrol Dosepak which in theory should help with arthritic flare.  Also patient's pain today seems somewhat out of proportion for arthritic pain.  Furthermore patient is only 35.  Recommend obtaining MRI for further investigation.  If MRI does not show anything may consider CMC joint injection at next appointment.  Patient offered work excuse to take her off work or give her restrictions, patient declined stating this is not necessary.    Patient's Body mass index is 36.03 kg/m². BMI is above normal parameters. Recommendations include: exercise counseling and nutrition counseling.    Return after MRI for results.    Christa Arnold, APRN

## 2021-04-21 NOTE — TELEPHONE ENCOUNTER
Per Dr. Lara, Ms. Tam has been called with recent lab results and recommendations.   Continue current medications and follow-up as planned or sooner if any problems.

## 2021-04-29 ENCOUNTER — HOSPITAL ENCOUNTER (OUTPATIENT)
Dept: MRI IMAGING | Facility: HOSPITAL | Age: 36
Discharge: HOME | End: 2021-04-29
Admitting: NURSE PRACTITIONER

## 2021-04-29 DIAGNOSIS — M79.645 THUMB PAIN, LEFT: ICD-10-CM

## 2021-04-29 PROCEDURE — 73218 MRI UPPER EXTREMITY W/O DYE: CPT

## 2021-05-10 ENCOUNTER — OFFICE VISIT (OUTPATIENT)
Dept: ORTHOPEDIC SURGERY | Facility: CLINIC | Age: 36
End: 2021-05-10

## 2021-05-10 VITALS — HEIGHT: 69 IN | BODY MASS INDEX: 36.14 KG/M2 | WEIGHT: 244 LBS

## 2021-05-10 DIAGNOSIS — G89.29 CHRONIC PAIN OF LEFT THUMB: Primary | ICD-10-CM

## 2021-05-10 DIAGNOSIS — M79.645 CHRONIC PAIN OF LEFT THUMB: Primary | ICD-10-CM

## 2021-05-10 PROCEDURE — 99213 OFFICE O/P EST LOW 20 MIN: CPT | Performed by: NURSE PRACTITIONER

## 2021-05-10 NOTE — PROGRESS NOTES
"Iris Alan is a 35 y.o. female      Chief Complaint   Patient presents with   • Left Hand - Follow-up, Pain     thumb   • Results     mri left hand done on 4/29/2021       HISTORY OF PRESENT ILLNESS: Patient is a 35-year-old female who presents today for follow-up of left thumb pain.  Left thumb pain has now been present for approximately 3 months.  Patient reports the pain is progressively worsening.  She reports the pain is constant.  Pain is moderate to severe.  Patient has been treated with Medrol Dosepak, Mobic, localized injection for de Quervain's, and thumb spica.  She is here to review MRI results.  She is wearing thumb spica.       CONCURRENT MEDICAL HISTORY:    The following portions of the patient's history were reviewed and updated as appropriate: allergies, current medications, past family history, past medical history, past social history, past surgical history and problem list.     ROS  No fevers or chills.  No chest pain or shortness of air.  No GI or  disturbances.  Left hand/thumb pain.    PHYSICAL EXAMINATION:       Ht 175.3 cm (69\")   Wt 111 kg (244 lb)   LMP  (LMP Unknown)   BMI 36.03 kg/m²     Physical Exam  Vitals and nursing note reviewed.   Constitutional:       General: She is not in acute distress.     Appearance: She is well-developed. She is not toxic-appearing.   HENT:      Head: Normocephalic.   Pulmonary:      Effort: Pulmonary effort is normal. No respiratory distress.   Skin:     General: Skin is warm and dry.   Neurological:      Mental Status: She is alert and oriented to person, place, and time.   Psychiatric:         Behavior: Behavior normal.         Thought Content: Thought content normal.         Judgment: Judgment normal.         GAIT:     [x]  Normal  []  Antalgic    Assistive device: [x]  None  []  Walker     []  Crutches  []  Cane     []  Wheelchair  []  Stretcher    Left Hand Exam     Tenderness   The patient is experiencing tenderness in the radial " area.     Tests   Finkelstein's test: positive    Other   Erythema: absent  Sensation: normal  Pulse: present    Comments:  Patient performs normal range of motion at wrist  However patient almost refuses to perform range of motion of thumb as she states it is painful.  Patient is able to slowly perform range of motion of thumb, though there seems to be some stiffness today.  No swelling seen today  No obvious deformity, bruising              MRI Hand Left Without Contrast    Result Date: 4/29/2021  Narrative: MRI left hand. HISTORY: Left hand pain. Left thumb pain. Prior exam: Left hand April 6, 2021. TECHNIQUE: Multiplanar multisequence noncontrast images left hand. FINDINGS: There is a mild degree of increased signal intensity, fluid surrounding the base of the thumb, extensor pollicis brevis or adductor pollicis longus. Series 10 image 32-34. This therefore suggestive of a mild degree of de Quervain's tenosynovitis. There is also subtle radial subluxation of the base of the first metacarpal with respect to the greater multangular more often seen in elderly patients with degenerative osteoarthritic changes. There is subtle increased signal intensity surrounding the first carpometacarpal joint. This therefore may represent a more acute process, traumatic capsular injury. No evidence of any fracture. MRI left hand is otherwise unremarkable.     Impression: As above. Electronically signed by:  Robbie Jackson MD  4/29/2021 12:44 PM CDT Workstation: [id number]                ASSESSMENT:    Diagnoses and all orders for this visit:    Chronic pain of left thumb  -     Ambulatory Referral to Hand Surgery          PLAN    MRI results reviewed.  There are findings of mild degree of signal intensity around the base of the thumb which is suggestive of a mild degree of de Quervain's.  There is also subtle radial subluxation of the base of the CMC joint which is more often only seen in elderly patients with degenerative  osteoarthritic change.  There is also a subtle increase in intensity surrounding the first carpometacarpal joint which may represent a more acute process such as traumatic capsular injury.  No evidence of fracture.  Patient reports no history of injury.  MRI results explained to patient, explained to patient that I am concerned that her not using her thumb as much is going to cause stiffness.  Warned patient of contracture from immobilizing thumb too long.  Recommend patient proceed with formal physical therapy.  Also offered localized CMC joint injection.  Patient declines these offers and would like a second opinion.  Patient given options between surgeon in our office and hand specialist.  Patient desires to see hand specialist.  Referral made.  Patient instructed to take MRI results with her.  Patient currently taken off work as symptoms are making work difficult.  Patient to return in 2 weeks for recheck.      Return in about 2 weeks (around 5/24/2021).    Christa Arnold, LEN

## 2021-05-24 ENCOUNTER — OFFICE VISIT (OUTPATIENT)
Dept: ORTHOPEDIC SURGERY | Facility: CLINIC | Age: 36
End: 2021-05-24

## 2021-05-24 VITALS — BODY MASS INDEX: 37.18 KG/M2 | HEIGHT: 69 IN | WEIGHT: 251 LBS

## 2021-05-24 DIAGNOSIS — G89.29 CHRONIC PAIN OF LEFT THUMB: Primary | ICD-10-CM

## 2021-05-24 DIAGNOSIS — M79.645 CHRONIC PAIN OF LEFT THUMB: Primary | ICD-10-CM

## 2021-05-24 DIAGNOSIS — M65.4 DE QUERVAIN'S DISEASE (TENOSYNOVITIS): ICD-10-CM

## 2021-05-24 PROCEDURE — 99212 OFFICE O/P EST SF 10 MIN: CPT | Performed by: NURSE PRACTITIONER

## 2021-05-24 NOTE — PROGRESS NOTES
"Iris Alan is a 36 y.o. female      Chief Complaint   Patient presents with   • Left Hand - Follow-up, Pain       HISTORY OF PRESENT ILLNESS: Patient is a 35-year-old female who presents today for follow-up of left thumb pain.  Left thumb pain has now been present for over 3 months.  Patient reports symptoms are no different than when last being seen.  This appointment was made so that we could reevaluate patient's ability to return to work, patient states at this point time she does not feel that she is ready to do so.  She continues to report pain is constant.  Rates her pain as a 3 out of 10 currently.  This pain increases with nearly any activity of thumb.  Patient has been treated with Medrol Dosepak, Mobic, localized injection for de Quervain's, and thumb spica.  She has had an MRI recently.  Patient reports that she is having numbness of her left thumb.  She describes this as the tip of her thumb and the dorsal thumb down to her wrist.  She reports she can feel pain and pressure but her skin feels numb. She reports this started sometime after De Quervain's injection. She has an appointment with Dr. Barrientos on 6/3/2021. She reports pain in right thumb has completely resolved since taking off work and is not currently bothering her.      CONCURRENT MEDICAL HISTORY:    The following portions of the patient's history were reviewed and updated as appropriate: allergies, current medications, past family history, past medical history, past social history, past surgical history and problem list.     ROS  No fevers or chills.  No chest pain or shortness of air.  No GI or  disturbances. Left thumb pain.     PHYSICAL EXAMINATION:       Ht 175.3 cm (69\")   Wt 114 kg (251 lb)   LMP  (LMP Unknown)   BMI 37.07 kg/m²     Physical Exam  Vitals and nursing note reviewed.   Constitutional:       General: She is not in acute distress.     Appearance: She is well-developed. She is not toxic-appearing.   HENT:      " Head: Normocephalic.   Pulmonary:      Effort: Pulmonary effort is normal. No respiratory distress.   Skin:     General: Skin is warm and dry.   Neurological:      Mental Status: She is alert and oriented to person, place, and time.   Psychiatric:         Behavior: Behavior normal.         Thought Content: Thought content normal.         Judgment: Judgment normal.         GAIT:     [x]  Normal  []  Antalgic    Assistive device: [x]  None  []  Walker     []  Crutches  []  Cane     []  Wheelchair  []  Stretcher    Left Hand Exam     Tenderness   The patient is experiencing tenderness in the radial area.     Other   Erythema: absent  Sensation: decreased  Pulse: present    Comments:  Patient performs normal range of motion at wrist  Pain and limitations with range of motion of thumb  No swelling seen today  No obvious deformity, bruising  Patient reports being able to feel pressure at tip of thumb and on dorsal aspect of thumb, however patient reports reduced sensation.  Patient states that she cannot tell the difference between me touching her softly with my finger and me poking her with my fingernail  Thumb is warm, pink, with good capillary refill                          ASSESSMENT:    Diagnoses and all orders for this visit:    Chronic pain of left thumb    De Quervain's disease (tenosynovitis)          PLAN      Patient given work note to remain off work at this time.  Recommend patient stay off work until she can be evaluated by hand specialist on 6/3/2021.  I am not sure of the cause of patient's thumb numbness, discussed possibly performing EMG after patient sees hand specialist if necessary if he does not have any other recommendations.  In interim patient instructed to continue performing gentle range of motion of thumb to prevent stiffness.  Patient also to continue activity modification and rice therapy as needed.  Signs and symptoms to report and when to seek care explained to patient.  Patient verbalized  understanding.  Patient instructed to return or call after her hand specialist appointment so she can update me on plan of care.     Return for Recheck after seeing hand specialist.    Christa Arnold, APRN

## 2021-06-21 ENCOUNTER — TRANSCRIBE ORDERS (OUTPATIENT)
Dept: LAB | Facility: HOSPITAL | Age: 36
End: 2021-06-21

## 2021-06-21 ENCOUNTER — LAB (OUTPATIENT)
Dept: LAB | Facility: HOSPITAL | Age: 36
End: 2021-06-21

## 2021-06-21 DIAGNOSIS — Z79.899 ENCOUNTER FOR LONG-TERM (CURRENT) USE OF OTHER MEDICATIONS: Primary | ICD-10-CM

## 2021-06-21 DIAGNOSIS — Z79.899 ENCOUNTER FOR LONG-TERM (CURRENT) USE OF OTHER MEDICATIONS: ICD-10-CM

## 2021-06-21 PROCEDURE — 36415 COLL VENOUS BLD VENIPUNCTURE: CPT

## 2021-06-21 PROCEDURE — 84478 ASSAY OF TRIGLYCERIDES: CPT

## 2021-06-21 PROCEDURE — 84460 ALANINE AMINO (ALT) (SGPT): CPT

## 2021-06-21 PROCEDURE — 84450 TRANSFERASE (AST) (SGOT): CPT

## 2021-06-22 LAB
ALT SERPL W P-5'-P-CCNC: 27 U/L (ref 1–33)
AST SERPL-CCNC: 27 U/L (ref 1–32)
TRIGL SERPL-MCNC: 188 MG/DL (ref 0–150)

## 2021-07-12 ENCOUNTER — OFFICE VISIT (OUTPATIENT)
Dept: FAMILY MEDICINE CLINIC | Facility: CLINIC | Age: 36
End: 2021-07-12

## 2021-07-12 VITALS
DIASTOLIC BLOOD PRESSURE: 80 MMHG | HEIGHT: 69 IN | SYSTOLIC BLOOD PRESSURE: 130 MMHG | OXYGEN SATURATION: 100 % | BODY MASS INDEX: 38.21 KG/M2 | HEART RATE: 56 BPM | WEIGHT: 258 LBS

## 2021-07-12 DIAGNOSIS — E66.09 CLASS 2 OBESITY DUE TO EXCESS CALORIES WITHOUT SERIOUS COMORBIDITY WITH BODY MASS INDEX (BMI) OF 38.0 TO 38.9 IN ADULT: ICD-10-CM

## 2021-07-12 DIAGNOSIS — R06.02 SHORTNESS OF BREATH: Primary | ICD-10-CM

## 2021-07-12 PROCEDURE — 99213 OFFICE O/P EST LOW 20 MIN: CPT | Performed by: FAMILY MEDICINE

## 2021-07-12 RX ORDER — ALBUTEROL SULFATE 90 UG/1
2 AEROSOL, METERED RESPIRATORY (INHALATION) EVERY 4 HOURS PRN
Qty: 18 G | Refills: 2 | Status: SHIPPED | OUTPATIENT
Start: 2021-07-12

## 2021-07-12 RX ORDER — CETIRIZINE HYDROCHLORIDE 10 MG/1
10 TABLET ORAL DAILY
Qty: 30 TABLET | Refills: 2 | Status: SHIPPED | OUTPATIENT
Start: 2021-07-12

## 2021-07-12 NOTE — PROGRESS NOTES
"Chief Complaint  Follow-up    Subjective          Iris Alan presents to [facility]  History of Present Illness    Patient presents today for follow up.  Patient says that she is doing about the same.  Still having intermittent episodes of shortness of breath.   Taking Symbicort and albuterol PRN.  Patient has concerns about her weight.  She has been eating about 3 meals a day.  Doing less activity than before.  She had regular physical activity with walking and going to the gym prior to COVID pandemic.     Objective   Vital Signs:   /80   Pulse 56   Ht 175.3 cm (69\")   Wt 117 kg (258 lb)   SpO2 100%   BMI 38.10 kg/m²     Physical Exam  Vitals reviewed.   Constitutional:       General: She is not in acute distress.     Appearance: She is well-developed.   HENT:      Head: Normocephalic and atraumatic.   Cardiovascular:      Rate and Rhythm: Normal rate and regular rhythm.      Heart sounds: Normal heart sounds. No murmur heard.     Pulmonary:      Effort: Pulmonary effort is normal. No respiratory distress.      Breath sounds: Normal breath sounds. No wheezing or rales.   Abdominal:      Palpations: Abdomen is soft.      Tenderness: There is no abdominal tenderness.   Skin:     General: Skin is warm and dry.   Neurological:      Mental Status: She is alert and oriented to person, place, and time.               Assessment and Plan    Diagnoses and all orders for this visit:    1. Shortness of breath (Primary)  -     Ventolin  (90 Base) MCG/ACT inhaler; Inhale 2 puffs Every 4 (Four) Hours As Needed for Wheezing.  Dispense: 18 g; Refill: 2  -     Full Pulmonary Function Test With Bronchodilator; Future    2. Class 2 obesity due to excess calories without serious comorbidity with body mass index (BMI) of 38.0 to 38.9 in adult    Other orders  -     cetirizine (zyrTEC) 10 MG tablet; Take 1 tablet by mouth Daily.  Dispense: 30 tablet; Refill: 2       Patient " seen today for follow up  Persistent, intermittent shortness of breath following COVID19 infection  Will get PFTs for further evaluation  If severe symptoms, patient should be seen in ED  Will continue current inhalers  Patient's Body mass index is 38.1 kg/m². indicating that she is obese (BMI >30). Obesity-related health conditions include the following: none.  BMI is is above average; BMI management plan is completed. We discussed low calorie, low carb based diet program, portion control, increasing exercise and an janeth-based approach such as Trivop Pal or Lose It.  Patient will keep diet and exercise log prior to next office visit.  Pharmacologic options to be discussed at this time.            Follow Up   Return in about 4 weeks (around 8/9/2021) for Recheck.  Patient was given instructions and counseling regarding her condition or for health maintenance advice. Please see specific information pulled into the AVS if appropriate.         This document has been electronically signed by Dahiana Lara MD

## 2021-07-26 RX ORDER — BUDESONIDE AND FORMOTEROL FUMARATE DIHYDRATE 160; 4.5 UG/1; UG/1
AEROSOL RESPIRATORY (INHALATION)
Qty: 1 EACH | Refills: 2 | Status: SHIPPED | OUTPATIENT
Start: 2021-07-26

## 2021-07-30 ENCOUNTER — APPOINTMENT (OUTPATIENT)
Dept: PULMONOLOGY | Facility: HOSPITAL | Age: 36
End: 2021-07-30

## 2021-08-13 ENCOUNTER — HOSPITAL ENCOUNTER (OUTPATIENT)
Dept: PULMONOLOGY | Facility: HOSPITAL | Age: 36
Discharge: HOME | End: 2021-08-13
Admitting: FAMILY MEDICINE

## 2021-08-13 DIAGNOSIS — R06.02 SHORTNESS OF BREATH: ICD-10-CM

## 2021-08-13 PROCEDURE — 94727 GAS DIL/WSHOT DETER LNG VOL: CPT

## 2021-08-13 PROCEDURE — 94729 DIFFUSING CAPACITY: CPT | Performed by: INTERNAL MEDICINE

## 2021-08-13 PROCEDURE — 94060 EVALUATION OF WHEEZING: CPT

## 2021-08-13 PROCEDURE — 94060 EVALUATION OF WHEEZING: CPT | Performed by: INTERNAL MEDICINE

## 2021-08-13 PROCEDURE — 94729 DIFFUSING CAPACITY: CPT

## 2021-08-13 PROCEDURE — 94727 GAS DIL/WSHOT DETER LNG VOL: CPT | Performed by: INTERNAL MEDICINE

## 2021-08-16 ENCOUNTER — OFFICE VISIT (OUTPATIENT)
Dept: FAMILY MEDICINE CLINIC | Facility: CLINIC | Age: 36
End: 2021-08-16

## 2021-08-16 VITALS
DIASTOLIC BLOOD PRESSURE: 96 MMHG | WEIGHT: 251 LBS | HEART RATE: 90 BPM | HEIGHT: 69 IN | BODY MASS INDEX: 37.18 KG/M2 | OXYGEN SATURATION: 98 % | SYSTOLIC BLOOD PRESSURE: 128 MMHG

## 2021-08-16 DIAGNOSIS — E66.09 CLASS 2 OBESITY DUE TO EXCESS CALORIES WITHOUT SERIOUS COMORBIDITY WITH BODY MASS INDEX (BMI) OF 37.0 TO 37.9 IN ADULT: ICD-10-CM

## 2021-08-16 DIAGNOSIS — U09.9 PERSISTENT SHORTNESS OF BREATH AFTER COVID-19: Primary | ICD-10-CM

## 2021-08-16 DIAGNOSIS — R06.02 PERSISTENT SHORTNESS OF BREATH AFTER COVID-19: Primary | ICD-10-CM

## 2021-08-16 PROCEDURE — 99214 OFFICE O/P EST MOD 30 MIN: CPT | Performed by: FAMILY MEDICINE

## 2021-08-16 NOTE — PROGRESS NOTES
"Chief Complaint  Shortness of Breath (4 week recheck)    Subjective          Iris Casandra Alan presents to [facility]  History of Present Illness    Patient presents today for follow up.  Had COVID infection again a few weeks ago, diagnosed positive on 7/29/2021.  She says that illness this time was not as severe.  Does not feel like her breathing is worse than it was before, not better.  She has pulmonary function testing a couple of days ago - results pending. Using Symbicort and Ventolin PRN.    Patient has concerns about her weight.  Kept log with diet and physical activity prior to visits.  Has been making good efforts at weight loss.  Patient would like a medication to help with her weight.      Objective   Vital Signs:   /96   Pulse 90   Ht 175.3 cm (69\")   Wt 114 kg (251 lb)   SpO2 98%   BMI 37.07 kg/m²     Physical Exam  Vitals reviewed.   Constitutional:       General: She is not in acute distress.     Appearance: She is well-developed.   HENT:      Head: Normocephalic and atraumatic.   Cardiovascular:      Rate and Rhythm: Normal rate and regular rhythm.      Heart sounds: Normal heart sounds. No murmur heard.     Pulmonary:      Effort: Pulmonary effort is normal. No respiratory distress.      Breath sounds: Normal breath sounds. No wheezing or rales.   Skin:     General: Skin is warm and dry.      Findings: No rash.   Neurological:      Mental Status: She is alert and oriented to person, place, and time.        Result Review :   The following data was reviewed by: Dahiana Lara MD on 08/16/2021:  Common labs    Common Labsle 4/15/21 4/15/21 4/15/21 6/21/21 6/21/21 6/21/21    2240 2240 2240 0809 0809 0809   Glucose   87      BUN   15      Creatinine   0.81      eGFR Non African Am   80      Sodium   139      Potassium   4.6      Chloride   100      Calcium   9.7      Albumin   4.50      Total Bilirubin   0.2      Alkaline Phosphatase   72      AST " (SGOT)   23  27    ALT (SGPT)   21   27   WBC 7.76        Hemoglobin 13.3        Hematocrit 39.3        Platelets 383        Total Cholesterol  197       Triglycerides  166 (A)  188 (A)     HDL Cholesterol  47       LDL Cholesterol   121 (A)       (A) Abnormal value                   Wt Readings from Last 3 Encounters:   08/16/21 114 kg (251 lb)   07/12/21 117 kg (258 lb)   05/24/21 114 kg (251 lb)           Assessment and Plan    Diagnoses and all orders for this visit:    1. Persistent shortness of breath after COVID-19 (Primary)  -     Ambulatory Referral to Pulmonology    2. Class 2 obesity due to excess calories without serious comorbidity with body mass index (BMI) of 37.0 to 37.9 in adult      Patient seen today for follow up  Continues to have shortness of breath following COVID19 infection  Most recent infection has resolved, patient is not having acute symptoms  Continue Symbicort and albuterol PRN  Referral to pulmonology for additional evaluation/management  Discussed COVID19 vaccination following acute infection    Body mass index is 37.07 kg/m².  Discussed weight loss medication previously, however given recent illness with COVID19 infection again, will hold off starting new medication for a few weeks  Phentermine is likely unsafe  Will plan to start Saxenda in 4 weeks as long as patient is doing well/healthy  Encouraged continued lifestyle modification with diet/exercise          Follow Up   Return in about 3 months (around 11/16/2021) for Recheck.  Patient was given instructions and counseling regarding her condition or for health maintenance advice. Please see specific information pulled into the AVS if appropriate.         This document has been electronically signed by Dahiana Lara MD

## 2021-08-26 ENCOUNTER — TELEPHONE (OUTPATIENT)
Dept: FAMILY MEDICINE CLINIC | Facility: CLINIC | Age: 36
End: 2021-08-26

## 2021-08-26 NOTE — TELEPHONE ENCOUNTER
Please let patient know that lung function testing does not show any evidence of restrictive or obstructive disease.  Testing will be reviewed in greater detail by pulmonology.  Patient should keep appointment with pulmonology provider 9/2/2021.  ThanksYECENIA

## 2021-08-26 NOTE — TELEPHONE ENCOUNTER
Per Dr. Lara, Ms. Alan has been called with recent Pulmonary Test results and recommendations.   Continue current medications and follow-up as planned or sooner if any problems

## 2021-09-15 ENCOUNTER — PATIENT MESSAGE (OUTPATIENT)
Dept: FAMILY MEDICINE CLINIC | Facility: CLINIC | Age: 36
End: 2021-09-15

## 2021-09-15 DIAGNOSIS — E66.09 CLASS 2 OBESITY DUE TO EXCESS CALORIES WITHOUT SERIOUS COMORBIDITY WITH BODY MASS INDEX (BMI) OF 37.0 TO 37.9 IN ADULT: Primary | ICD-10-CM

## 2021-12-16 ENCOUNTER — OFFICE VISIT (OUTPATIENT)
Dept: FAMILY MEDICINE CLINIC | Facility: CLINIC | Age: 36
End: 2021-12-16

## 2021-12-16 ENCOUNTER — PATIENT MESSAGE (OUTPATIENT)
Dept: FAMILY MEDICINE CLINIC | Facility: CLINIC | Age: 36
End: 2021-12-16

## 2021-12-16 VITALS
OXYGEN SATURATION: 98 % | HEART RATE: 105 BPM | SYSTOLIC BLOOD PRESSURE: 108 MMHG | WEIGHT: 257 LBS | BODY MASS INDEX: 38.06 KG/M2 | DIASTOLIC BLOOD PRESSURE: 68 MMHG | HEIGHT: 69 IN

## 2021-12-16 DIAGNOSIS — E66.09 CLASS 2 OBESITY DUE TO EXCESS CALORIES WITHOUT SERIOUS COMORBIDITY WITH BODY MASS INDEX (BMI) OF 37.0 TO 37.9 IN ADULT: Primary | ICD-10-CM

## 2021-12-16 DIAGNOSIS — E66.09 CLASS 2 OBESITY DUE TO EXCESS CALORIES WITHOUT SERIOUS COMORBIDITY WITH BODY MASS INDEX (BMI) OF 37.0 TO 37.9 IN ADULT: ICD-10-CM

## 2021-12-16 PROCEDURE — 99213 OFFICE O/P EST LOW 20 MIN: CPT | Performed by: FAMILY MEDICINE

## 2021-12-16 NOTE — PROGRESS NOTES
"Chief Complaint  Obesity (f/u)    Subjective          Iris Casandra Alan presents to [facility] [place]  History of Present Illness    Patient seen today for follow-up obesity.  She has been trying to follow a healthy diet.  Goes to the gym about 3 to 5 days a week for exercise.  She has been doing pretty well with lifestyle modifications.  Patient was unaware that Saxenda was at the pharmacy, so she has not started taking this medication.  No new concerns today.  Patient has been on phentermine in the recent past.  Notes that this medication helps with weight loss.    Current Outpatient Medications on File Prior to Visit   Medication Sig Dispense Refill   • cetirizine (zyrTEC) 10 MG tablet Take 1 tablet by mouth Daily. 30 tablet 2   • Symbicort 160-4.5 MCG/ACT inhaler INHALE 2 PUFFS INTO THE LUNGS TWICE A DAY 1 each 2   • Ventolin  (90 Base) MCG/ACT inhaler Inhale 2 puffs Every 4 (Four) Hours As Needed for Wheezing. 18 g 2     No current facility-administered medications on file prior to visit.       Objective   Vital Signs:   /68 (BP Location: Left arm, Patient Position: Sitting)   Pulse 105   Ht 175.3 cm (69\")   Wt 117 kg (257 lb)   SpO2 98%   BMI 37.95 kg/m²     Physical Exam  Vitals reviewed.   Constitutional:       General: She is not in acute distress.     Appearance: She is well-developed.   Cardiovascular:      Rate and Rhythm: Normal rate and regular rhythm.      Heart sounds: Normal heart sounds. No murmur heard.      Pulmonary:      Effort: Pulmonary effort is normal. No respiratory distress.      Breath sounds: Normal breath sounds. No wheezing or rales.   Abdominal:      Palpations: Abdomen is soft.      Tenderness: There is no abdominal tenderness.   Skin:     General: Skin is warm and dry.      Findings: No rash.   Neurological:      Mental Status: She is alert and oriented to person, place, and time.        Result Review :   The " following data was reviewed by: Dahiana Lara MD on 12/16/2021:  Common labs    Common Labsle 4/15/21 4/15/21 4/15/21 6/21/21    2240 2240 2240 0809   Glucose   87    BUN   15    Creatinine   0.81    eGFR Non African Am   80    Sodium   139    Potassium   4.6    Chloride   100    Calcium   9.7    Albumin   4.50    Total Bilirubin   0.2    Alkaline Phosphatase   72    AST (SGOT)   23    ALT (SGPT)   21    WBC 7.76      Hemoglobin 13.3      Hematocrit 39.3      Platelets 383      Total Cholesterol  197     Triglycerides  166 (A)  188 (A)   HDL Cholesterol  47     LDL Cholesterol   121 (A)     (A) Abnormal value                      Assessment and Plan    Diagnoses and all orders for this visit:    1. Class 2 obesity due to excess calories without serious comorbidity with body mass index (BMI) of 37.0 to 37.9 in adult  -     Liraglutide (SAXENDA) 18 MG/3ML injection pen; Inject 0.6mg under skin daily for week one, THEN 1.2mg daily for week two, THEN 1.8mg daily for week three, then 2.4mg daily for week four.  Dispense: 3 pen; Refill: 0  -     Insulin Pen Needle 31G X 5 MM misc; Use with Saxenda  Dispense: 30 each; Refill: 2      Patient seen today for follow-up.  Weight is slightly increased from previous visit  Patient has been doing a good job with lifestyle modification, diet and regular exercise  Will resend Saxenda, and patient will start this medication  Discussed risk and benefits of this medication again  Follow-up in 4 weeks          Follow Up   Return in about 4 weeks (around 1/13/2022) for Recheck.  Patient was given instructions and counseling regarding her condition or for health maintenance advice. Please see specific information pulled into the AVS if appropriate.         This document has been electronically signed by Dahiana Lara MD

## 2021-12-17 ENCOUNTER — TELEPHONE (OUTPATIENT)
Dept: FAMILY MEDICINE CLINIC | Facility: CLINIC | Age: 36
End: 2021-12-17

## 2021-12-21 ENCOUNTER — TELEPHONE (OUTPATIENT)
Dept: FAMILY MEDICINE CLINIC | Facility: CLINIC | Age: 36
End: 2021-12-21

## 2021-12-23 NOTE — TELEPHONE ENCOUNTER
Please let patient know it was denied.  Ask that she call the pharmacy and find out her cash price for medication to see if this is something she can afford.  Apparently, medicaid has been denying all weight loss medications at this time.  Thanks, YECENIA Lara

## 2022-01-07 NOTE — TELEPHONE ENCOUNTER
Tried to call patient to let her know that medicaid won't cover weight loss medication and the cash price for saxenda is over $1000. Per Dr. Lara we can refer her to a weight loss program. Unable to reach patient to speak to her about this.

## 2022-01-07 NOTE — TELEPHONE ENCOUNTER
Roseanne, does her insurance cover any of the weight loss medications?  Is there any way for her to get the medications with cash pay option that you know of? - YECENIA Lara

## 2023-05-02 ENCOUNTER — LAB (OUTPATIENT)
Dept: LAB | Facility: OTHER | Age: 38
End: 2023-05-02
Payer: MEDICAID

## 2023-05-02 ENCOUNTER — OFFICE VISIT (OUTPATIENT)
Dept: FAMILY MEDICINE CLINIC | Facility: CLINIC | Age: 38
End: 2023-05-02
Payer: MEDICAID

## 2023-05-02 VITALS
WEIGHT: 250.4 LBS | OXYGEN SATURATION: 99 % | DIASTOLIC BLOOD PRESSURE: 82 MMHG | BODY MASS INDEX: 37.09 KG/M2 | TEMPERATURE: 97.3 F | SYSTOLIC BLOOD PRESSURE: 130 MMHG | HEART RATE: 96 BPM | HEIGHT: 69 IN

## 2023-05-02 DIAGNOSIS — E66.09 CLASS 2 OBESITY DUE TO EXCESS CALORIES WITHOUT SERIOUS COMORBIDITY WITH BODY MASS INDEX (BMI) OF 37.0 TO 37.9 IN ADULT: ICD-10-CM

## 2023-05-02 DIAGNOSIS — E66.09 CLASS 2 OBESITY DUE TO EXCESS CALORIES WITHOUT SERIOUS COMORBIDITY WITH BODY MASS INDEX (BMI) OF 37.0 TO 37.9 IN ADULT: Primary | ICD-10-CM

## 2023-05-02 DIAGNOSIS — K42.9 UMBILICAL HERNIA WITHOUT OBSTRUCTION AND WITHOUT GANGRENE: ICD-10-CM

## 2023-05-02 LAB
ALBUMIN SERPL-MCNC: 4.2 G/DL (ref 3.5–5)
ALBUMIN/GLOB SERPL: 1.2 G/DL (ref 1.1–1.8)
ALP SERPL-CCNC: 73 U/L (ref 38–126)
ALT SERPL W P-5'-P-CCNC: 28 U/L
ANION GAP SERPL CALCULATED.3IONS-SCNC: 6 MMOL/L (ref 5–15)
AST SERPL-CCNC: 25 U/L (ref 14–36)
BASOPHILS # BLD AUTO: 0.03 10*3/MM3 (ref 0–0.2)
BASOPHILS NFR BLD AUTO: 0.4 % (ref 0–1.5)
BILIRUB SERPL-MCNC: 0.6 MG/DL (ref 0.2–1.3)
BUN SERPL-MCNC: 14 MG/DL (ref 7–23)
BUN/CREAT SERPL: 18.7 (ref 7–25)
CALCIUM SPEC-SCNC: 9.3 MG/DL (ref 8.4–10.2)
CHLORIDE SERPL-SCNC: 101 MMOL/L (ref 101–112)
CHOLEST SERPL-MCNC: 198 MG/DL (ref 150–200)
CO2 SERPL-SCNC: 30 MMOL/L (ref 22–30)
CREAT SERPL-MCNC: 0.75 MG/DL (ref 0.52–1.04)
DEPRECATED RDW RBC AUTO: 42.3 FL (ref 37–54)
EGFRCR SERPLBLD CKD-EPI 2021: 104.7 ML/MIN/1.73
EOSINOPHIL # BLD AUTO: 0.12 10*3/MM3 (ref 0–0.4)
EOSINOPHIL NFR BLD AUTO: 1.5 % (ref 0.3–6.2)
ERYTHROCYTE [DISTWIDTH] IN BLOOD BY AUTOMATED COUNT: 13.3 % (ref 12.3–15.4)
GLOBULIN UR ELPH-MCNC: 3.6 GM/DL (ref 2.3–3.5)
GLUCOSE SERPL-MCNC: 97 MG/DL (ref 70–99)
HCT VFR BLD AUTO: 40.1 % (ref 34–46.6)
HDLC SERPL-MCNC: 41 MG/DL (ref 40–59)
HGB BLD-MCNC: 13.4 G/DL (ref 12–15.9)
LDLC SERPL CALC-MCNC: 131 MG/DL
LDLC/HDLC SERPL: 3.12 {RATIO} (ref 0–3.22)
LYMPHOCYTES # BLD AUTO: 2.67 10*3/MM3 (ref 0.7–3.1)
LYMPHOCYTES NFR BLD AUTO: 33.7 % (ref 19.6–45.3)
MCH RBC QN AUTO: 29.8 PG (ref 26.6–33)
MCHC RBC AUTO-ENTMCNC: 33.4 G/DL (ref 31.5–35.7)
MCV RBC AUTO: 89.1 FL (ref 79–97)
MONOCYTES # BLD AUTO: 0.44 10*3/MM3 (ref 0.1–0.9)
MONOCYTES NFR BLD AUTO: 5.6 % (ref 5–12)
NEUTROPHILS NFR BLD AUTO: 4.66 10*3/MM3 (ref 1.7–7)
NEUTROPHILS NFR BLD AUTO: 58.8 % (ref 42.7–76)
PLATELET # BLD AUTO: 353 10*3/MM3 (ref 140–450)
PMV BLD AUTO: 9.7 FL (ref 6–12)
POTASSIUM SERPL-SCNC: 4 MMOL/L (ref 3.4–5)
PROT SERPL-MCNC: 7.8 G/DL (ref 6.3–8.6)
RBC # BLD AUTO: 4.5 10*6/MM3 (ref 3.77–5.28)
SODIUM SERPL-SCNC: 137 MMOL/L (ref 137–145)
TRIGL SERPL-MCNC: 145 MG/DL
VLDLC SERPL-MCNC: 26 MG/DL (ref 5–40)
WBC NRBC COR # BLD: 7.92 10*3/MM3 (ref 3.4–10.8)

## 2023-05-02 PROCEDURE — 83036 HEMOGLOBIN GLYCOSYLATED A1C: CPT | Performed by: NURSE PRACTITIONER

## 2023-05-02 PROCEDURE — 80061 LIPID PANEL: CPT | Performed by: NURSE PRACTITIONER

## 2023-05-02 PROCEDURE — 80050 GENERAL HEALTH PANEL: CPT | Performed by: NURSE PRACTITIONER

## 2023-05-02 NOTE — PROGRESS NOTES
"Chief Complaint  Establish Care and Hernia (Wants hernia looked at)    Subjective        Iris Guajardo Ursula Alan presents to [facility] [place]  History of Present Illness  Patient here today to establish care.  She is requesting referral for evaluation of umbilical hernia.  States that 3 years ago she was noted to have umbilical hernia on CAT scan and in the last year she has noticed that there is more bulging and distention especially when she is exercising and doing sit ups right above the umbilicus.  Requesting referral locally since she works in [place] at [employer].  She does report cough since that she has had COVID.  Reports that she has had COVID 5 different times most recent was last year, reports that her previous PCP did a PFT and she tells me that this was negative.  This is confirmed by review of PFT performed on 8/13/2021.  She denies tobacco use or recreational drug use.  States that she drinks \"socially \".  Additionally, she is here to discuss obesity.  States that she was given Saxenda in the past and she paid for it out-of-pocket from a pharmacy which compounded it in [place].  She states that it cost more than $500 and she cannot, does not want to, pay out-of-pocket again.  She states at that time she had a different insurance than she does now.  Would like to try either Saxenda or Wegovy or other medicine as appropriate for weight loss.  She is unsure if she is diabetic or not, overdue for labs.  Is fasting today to have them drawn.  Objective   Vital Signs:  /82   Pulse 96   Temp 97.3 °F (36.3 °C)   Ht 175.3 cm (69\")   Wt 114 kg (250 lb 6.4 oz)   SpO2 99%   BMI 36.98 kg/m²   Estimated body mass index is 36.98 kg/m² as calculated from the following:    Height as of this encounter: 175.3 cm (69\").    Weight as of this encounter: 114 kg (250 lb 6.4 oz).     Class 2 Severe Obesity (BMI >=35 and <=39.9). Obesity-related health " conditions include the following: none. Obesity is unchanged. BMI is is above average; BMI management plan is completed. We discussed low calorie, low carb based diet program, portion control, increasing exercise and pharmacologic options including Saxenda, Wegovy, Mounjaro, Ozempic.    Physical Exam  Vitals and nursing note reviewed.   Constitutional:       General: She is not in acute distress.     Appearance: Normal appearance. She is obese. She is not ill-appearing, toxic-appearing or diaphoretic.   HENT:      Head: Normocephalic and atraumatic.   Cardiovascular:      Rate and Rhythm: Normal rate and regular rhythm.      Heart sounds: Normal heart sounds. No murmur heard.    No friction rub. No gallop.   Pulmonary:      Effort: Pulmonary effort is normal. No respiratory distress.      Breath sounds: Normal breath sounds. No stridor. No wheezing, rhonchi or rales.   Abdominal:      Hernia: A hernia is present. Hernia is present in the umbilical area.          Comments: Abdomen rounded with obesity, bulging tenderness noted to encircled area as above.  Exam limited due to patient habitus   Skin:     General: Skin is warm and dry.      Coloration: Skin is not jaundiced or pale.      Findings: No bruising, erythema, lesion or rash.   Neurological:      Mental Status: She is alert and oriented to person, place, and time.      Cranial Nerves: No cranial nerve deficit.      Motor: No weakness.      Coordination: Coordination normal.      Gait: Gait normal.   Psychiatric:         Mood and Affect: Mood normal.         Behavior: Behavior normal.         Thought Content: Thought content normal.         Judgment: Judgment normal.        Result Review :    CMP        5/2/2023    15:07   CMP   Glucose 97     BUN 14     Creatinine 0.75     EGFR 104.7     Sodium 137     Potassium 4.0     Chloride 101     Calcium 9.3     Total Protein 7.8     Albumin 4.2     Globulin 3.6     Total Bilirubin 0.6     Alkaline Phosphatase 73      AST (SGOT) 25     ALT (SGPT) 28     Albumin/Globulin Ratio 1.2     BUN/Creatinine Ratio 18.7     Anion Gap 6.0       CBC        5/2/2023    15:07   CBC   WBC 7.92     RBC 4.50     Hemoglobin 13.4     Hematocrit 40.1     MCV 89.1     MCH 29.8     MCHC 33.4     RDW 13.3     Platelets 353       CBC w/diff        5/2/2023    15:07   CBC w/Diff   WBC 7.92     RBC 4.50     Hemoglobin 13.4     Hematocrit 40.1     MCV 89.1     MCH 29.8     MCHC 33.4     RDW 13.3     Platelets 353     Neutrophil Rel % 58.8     Lymphocyte Rel % 33.7     Monocyte Rel % 5.6     Eosinophil Rel % 1.5     Basophil Rel % 0.4       Lipid Panel        5/2/2023    15:07   Lipid Panel   Total Cholesterol 198     Triglycerides 145     HDL Cholesterol 41     VLDL Cholesterol 26     LDL Cholesterol  131     LDL/HDL Ratio 3.12               Data reviewed: Radiologic studies CT abdomen pelvis from 9/26/2020 reviewed today         Assessment and Plan   Diagnoses and all orders for this visit:    1. Class 2 obesity due to excess calories without serious comorbidity with body mass index (BMI) of 37.0 to 37.9 in adult (Primary)  -     Hemoglobin A1c; Future  -     CBC & Differential; Future  -     Comprehensive metabolic panel; Future  -     Lipid panel; Future  -     TSH; Future    2. Umbilical hernia without obstruction and without gangrene  -     Ambulatory Referral to General Surgery    CT abdomen pelvis from 9/2020 reviewed today, due to physical exam she is referred on to surgery for evaluation and treatment as he deems necessary.  I will obtain labs as above inform of results via phone, she is encouraged to follow a low calorie diet low-carb diet increase exercise and depending upon lab results may consider starting her on Wegovy, Saxenda or Ozempic/Mounjaro as appropriate.  Will inform of results of labs via phone and treat accordingly.  Patient aware and in agreement to this plan.  All questions and concerns addressed with understanding verbalized.      I spent 25 minutes caring for Iris on this date of service. This time includes time spent by me in the following activities:preparing for the visit, reviewing tests, obtaining and/or reviewing a separately obtained history, performing a medically appropriate examination and/or evaluation , counseling and educating the patient/family/caregiver, ordering medications, tests, or procedures, referring and communicating with other health care professionals  and documenting information in the medical record  Follow Up   Return in 4 weeks (on 5/30/2023), or if symptoms worsen or fail to improve, for Recheck, or sooner as needed.  Patient was given instructions and counseling regarding her condition or for health maintenance advice. Please see specific information pulled into the AVS if appropriate.

## 2023-05-03 LAB
HBA1C MFR BLD: 5.5 % (ref 4.8–5.6)
TSH SERPL DL<=0.05 MIU/L-ACNC: 2 UIU/ML (ref 0.27–4.2)

## 2023-05-03 RX ORDER — SEMAGLUTIDE 0.25 MG/.5ML
0.25 INJECTION, SOLUTION SUBCUTANEOUS WEEKLY
Qty: 2 ML | Refills: 0 | Status: SHIPPED | OUTPATIENT
Start: 2023-05-03

## 2023-05-04 ENCOUNTER — PRIOR AUTHORIZATION (OUTPATIENT)
Dept: FAMILY MEDICINE CLINIC | Facility: CLINIC | Age: 38
End: 2023-05-04
Payer: MEDICAID

## 2023-05-04 NOTE — TELEPHONE ENCOUNTER
[id number] DX used Z68.37 BMI 36.98.    PA for Wegovy was DENIED. Weight loss medication is a plan exclusion. All paperwork is returned to Sita's office for further review.

## 2023-05-23 ENCOUNTER — OFFICE VISIT (OUTPATIENT)
Dept: OBSTETRICS AND GYNECOLOGY | Facility: CLINIC | Age: 38
End: 2023-05-23
Payer: MEDICAID

## 2023-05-23 VITALS
BODY MASS INDEX: 36.88 KG/M2 | HEIGHT: 69 IN | WEIGHT: 249 LBS | DIASTOLIC BLOOD PRESSURE: 78 MMHG | SYSTOLIC BLOOD PRESSURE: 122 MMHG

## 2023-05-23 DIAGNOSIS — N83.201 CYST OF RIGHT OVARY: ICD-10-CM

## 2023-05-23 DIAGNOSIS — R10.2 PELVIC PAIN: Primary | ICD-10-CM

## 2023-05-23 NOTE — PROGRESS NOTES
Subjective   Iris Casandra Alan is a 38 y.o. Follow up on CT    History of Present Illness  Hysterectomy and LSO 2013, r/t menorrhagia, chronic pelvic pain and endometriosis   Pap: NIL, 2012    Patient presents today as a follow up for a CT that was completed on 5/11/23. Results are as follows.   5/11/23 CT The right ovary is enlarged measuring 4.6 x 3.6 cm demonstrating low and heterogeneous density. This may represent a complex or hemorrhagic follicular cyst. A solid mass should be excluded. Patient was told by the provider that ordered it, that she would need to follow up with OBGYN for an ultrasound order.   Reports cramping like pain in the right lower pelvic area since 2021. This pain has just gotten progressively worse. Reports feeling bloated and full all the time. Denies any vaginal bleeding or family history of ovarian cancers.   Patient voiced that she would like to have this ovary removed since it has been causing her so much pain.         Ovarian Cyst  This is a new problem. The current episode started 1 to 4 weeks ago. The problem occurs intermittently. The problem has been waxing and waning. Pertinent negatives include no abdominal pain, change in bowel habit, chest pain, chills, coughing, fatigue, fever, headaches, nausea, urinary symptoms, vomiting or weakness. Nothing aggravates the symptoms. She has tried nothing for the symptoms. The treatment provided no relief.       The following portions of the patient's history were reviewed and updated as appropriate: allergies, current medications, past family history, past medical history, past social history, past surgical history and problem list.    Review of Systems   Constitutional: Negative for appetite change, chills, fatigue and fever.   Respiratory: Negative for apnea, cough, choking, chest tightness, shortness of breath, wheezing and stridor.    Cardiovascular: Negative for chest pain, palpitations and leg swelling.   Gastrointestinal:  Negative for abdominal pain, change in bowel habit, constipation, diarrhea, nausea and vomiting.   Genitourinary: Positive for pelvic pain. Negative for amenorrhea, breast discharge, breast lump, breast pain, decreased libido, decreased urine volume, difficulty urinating, dyspareunia, dysuria, flank pain, frequency, genital sores, hematuria, menstrual problem, pelvic pressure, urgency, urinary incontinence, vaginal bleeding, vaginal discharge and vaginal pain.   Neurological: Negative for weakness.       Objective   Physical Exam  Vitals reviewed.   Constitutional:       General: She is awake. She is not in acute distress.     Appearance: Normal appearance. She is well-developed and normal weight. She is not ill-appearing, toxic-appearing or diaphoretic.   Cardiovascular:      Rate and Rhythm: Normal rate and regular rhythm.      Pulses: Normal pulses.      Heart sounds: Normal heart sounds.   Pulmonary:      Effort: Pulmonary effort is normal.      Breath sounds: Normal breath sounds.   Abdominal:      General: Bowel sounds are normal.      Palpations: Abdomen is soft.      Tenderness: There is abdominal tenderness in the right lower quadrant.      Hernia: A hernia is present. Hernia is present in the umbilical area.       Skin:     General: Skin is warm and dry.   Neurological:      Mental Status: She is alert and easily aroused.   Psychiatric:         Behavior: Behavior is cooperative.           Assessment & Plan   Diagnoses and all orders for this visit:    1. Pelvic pain (Primary)  -     US Non-ob Transvaginal; Future    2. Cyst of right ovary      Will obtain TVUS. Will notify pt of lab results.   Will get pt scheduled for a surgical consult with an MD per pt request for possible removal of right ovary.   Reviewed with pt that if pain becomes severe to seek immediate medical attention.       This document has been electronically signed by LEN Green on May 23, 2023 13:39 CDT

## 2023-06-01 ENCOUNTER — PATIENT ROUNDING (BHMG ONLY) (OUTPATIENT)
Dept: FAMILY MEDICINE CLINIC | Facility: CLINIC | Age: 38
End: 2023-06-01

## 2023-06-05 NOTE — PROGRESS NOTES
"June 1, 2023      My name is Edgar, the Clinical Coordinator      I am  with [facility] AN[facility] RANDLE  [facility] - [address] [place] [id number] [phone].    Patient rounding completed while in office to officially welcome you to our practice and ask about your recent visit.     Tell me about your visit with us. What things went well? Patient reported to the medical assistant while in the office, \"great visit\".       We're always looking for ways to make our patients' experiences even better. Do you have recommendations on ways we may improve?  no    Overall were you satisfied with your first visit to our practice? yes       I appreciate you taking the time to speak with me today. Is there anything else I can do for you? no      Thank you, and have a great day.      "

## 2023-06-07 PROBLEM — Z87.42 HISTORY OF OVARIAN CYST: Status: ACTIVE | Noted: 2023-06-07

## 2023-06-07 PROBLEM — Z87.42 HISTORY OF ENDOMETRIOSIS: Status: ACTIVE | Noted: 2023-06-07

## 2023-06-07 PROBLEM — R10.2 PELVIC PAIN: Status: ACTIVE | Noted: 2023-06-07

## 2023-07-25 ENCOUNTER — PRE-ADMISSION TESTING (OUTPATIENT)
Dept: PREADMISSION TESTING | Facility: HOSPITAL | Age: 38
End: 2023-07-25
Payer: COMMERCIAL

## 2023-07-25 ENCOUNTER — OFFICE VISIT (OUTPATIENT)
Dept: OBSTETRICS AND GYNECOLOGY | Facility: CLINIC | Age: 38
End: 2023-07-25
Payer: COMMERCIAL

## 2023-07-25 VITALS
HEIGHT: 69 IN | RESPIRATION RATE: 16 BRPM | OXYGEN SATURATION: 98 % | WEIGHT: 200 LBS | HEART RATE: 77 BPM | BODY MASS INDEX: 29.62 KG/M2 | SYSTOLIC BLOOD PRESSURE: 118 MMHG | DIASTOLIC BLOOD PRESSURE: 80 MMHG

## 2023-07-25 VITALS
SYSTOLIC BLOOD PRESSURE: 116 MMHG | HEIGHT: 69 IN | WEIGHT: 248.8 LBS | BODY MASS INDEX: 36.85 KG/M2 | DIASTOLIC BLOOD PRESSURE: 82 MMHG

## 2023-07-25 DIAGNOSIS — Z87.42 HISTORY OF ENDOMETRIOSIS: ICD-10-CM

## 2023-07-25 DIAGNOSIS — Z87.42 HISTORY OF ENDOMETRIOSIS: Primary | ICD-10-CM

## 2023-07-25 DIAGNOSIS — R10.2 CHRONIC PELVIC PAIN IN FEMALE: ICD-10-CM

## 2023-07-25 DIAGNOSIS — Z87.42 HISTORY OF OVARIAN CYST: ICD-10-CM

## 2023-07-25 DIAGNOSIS — G89.29 CHRONIC PELVIC PAIN IN FEMALE: ICD-10-CM

## 2023-07-25 DIAGNOSIS — R10.2 PELVIC PAIN: ICD-10-CM

## 2023-07-25 LAB
ABO GROUP BLD: NORMAL
ANION GAP SERPL CALCULATED.3IONS-SCNC: 9 MMOL/L (ref 5–15)
BASOPHILS # BLD AUTO: 0.05 10*3/MM3 (ref 0–0.2)
BASOPHILS NFR BLD AUTO: 0.8 % (ref 0–1.5)
BLD GP AB SCN SERPL QL: NEGATIVE
BUN SERPL-MCNC: 9 MG/DL (ref 6–20)
BUN/CREAT SERPL: 14.8 (ref 7–25)
CALCIUM SPEC-SCNC: 8.8 MG/DL (ref 8.6–10.5)
CHLORIDE SERPL-SCNC: 104 MMOL/L (ref 98–107)
CO2 SERPL-SCNC: 25 MMOL/L (ref 22–29)
CREAT SERPL-MCNC: 0.61 MG/DL (ref 0.57–1)
DEPRECATED RDW RBC AUTO: 41.5 FL (ref 37–54)
EGFRCR SERPLBLD CKD-EPI 2021: 116.8 ML/MIN/1.73
EOSINOPHIL # BLD AUTO: 0.14 10*3/MM3 (ref 0–0.4)
EOSINOPHIL NFR BLD AUTO: 2.1 % (ref 0.3–6.2)
ERYTHROCYTE [DISTWIDTH] IN BLOOD BY AUTOMATED COUNT: 13.3 % (ref 12.3–15.4)
GLUCOSE SERPL-MCNC: 98 MG/DL (ref 65–99)
HCT VFR BLD AUTO: 34.9 % (ref 34–46.6)
HGB BLD-MCNC: 12 G/DL (ref 12–15.9)
IMM GRANULOCYTES # BLD AUTO: 0.04 10*3/MM3 (ref 0–0.05)
IMM GRANULOCYTES NFR BLD AUTO: 0.6 % (ref 0–0.5)
LYMPHOCYTES # BLD AUTO: 2.5 10*3/MM3 (ref 0.7–3.1)
LYMPHOCYTES NFR BLD AUTO: 38.1 % (ref 19.6–45.3)
Lab: NORMAL
MCH RBC QN AUTO: 29.8 PG (ref 26.6–33)
MCHC RBC AUTO-ENTMCNC: 34.4 G/DL (ref 31.5–35.7)
MCV RBC AUTO: 86.6 FL (ref 79–97)
MONOCYTES # BLD AUTO: 0.4 10*3/MM3 (ref 0.1–0.9)
MONOCYTES NFR BLD AUTO: 6.1 % (ref 5–12)
NEUTROPHILS NFR BLD AUTO: 3.43 10*3/MM3 (ref 1.7–7)
NEUTROPHILS NFR BLD AUTO: 52.3 % (ref 42.7–76)
NRBC BLD AUTO-RTO: 0 /100 WBC (ref 0–0.2)
PLATELET # BLD AUTO: 339 10*3/MM3 (ref 140–450)
PMV BLD AUTO: 9.8 FL (ref 6–12)
POTASSIUM SERPL-SCNC: 4.2 MMOL/L (ref 3.5–5.2)
RBC # BLD AUTO: 4.03 10*6/MM3 (ref 3.77–5.28)
RH BLD: POSITIVE
SODIUM SERPL-SCNC: 138 MMOL/L (ref 136–145)
T&S EXPIRATION DATE: NORMAL
WBC NRBC COR # BLD: 6.56 10*3/MM3 (ref 3.4–10.8)

## 2023-07-25 PROCEDURE — 80048 BASIC METABOLIC PNL TOTAL CA: CPT

## 2023-07-25 PROCEDURE — 36415 COLL VENOUS BLD VENIPUNCTURE: CPT

## 2023-07-25 PROCEDURE — 86850 RBC ANTIBODY SCREEN: CPT

## 2023-07-25 PROCEDURE — 86900 BLOOD TYPING SEROLOGIC ABO: CPT

## 2023-07-25 PROCEDURE — 85025 COMPLETE CBC W/AUTO DIFF WBC: CPT

## 2023-07-25 PROCEDURE — 86901 BLOOD TYPING SEROLOGIC RH(D): CPT

## 2023-07-25 NOTE — H&P (VIEW-ONLY)
[facility]  HISTORY & PHYSICAL - Gynecology    Name: Iris Alan  MRN: [id number]  Location: Room/bed info not found  Date: 2023  [facility]: [id number]      CHIEF COMPLAINT: PREOP for RA-laparoscopic right oophorectomy, possible excision of endometriosis, any other indicated procedures    HISTORY OF PRESENT ILLNESS  Iris Alan is a 38 y.o.  premenopausal female who presents with complaints of pelvic pain, worried about cyst OV.       SAM  for endometriosis and pelvic pain. Retains ROV.     When seen previously , stated: Pain up to 3 x per month, sometimes for couple hours, sometimes day or so, pain always low abdomen, states RLQ>LLQ. Sometimes sharp, sometimes achy. Sometimes Alleve or hot shower may help. Recent episode was so painful couldn't stand.      Sexually active. Occasionally uncomfortable during intercourse, present for long time. No VB since hysterectomy.      No h/o hot flashes, night sweat, mood swings.      Chronic constipation, states takes stool softners frequently. No blood in stool. No dysuria. SHILPI, no pad.       Hysterectomy and RS  FINAL DIAGNOSIS:   A.  UTERUS:             ENDOMETRIUM:                  SECRETORY PHASE, POST OVULATORY DAY 7.                  STATUS POST ENDOMETRIAL ABLATION, REMOTE.             MYOMETRIUM:                  NO SIGNIFICANT HISTOLOGIC ABNORMALITY.             SEROSA:                  ADHESIONS.                  ENDOMETRIOSIS.             CERVIX:                  PARAKERATOSIS.                  STATUS POST LOOP CONIZATION ([id number]).                  NEGATIVE FOR RESIDUAL DYSPLASIA.   B.  RIGHT FALLOPIAN TUBE, EXCISION:             STATUS POST SURGICAL INTERRUPTION, REMOTE.             PARATUBAL CYSTS.      2013 LSO   FINAL DIAGNOSIS:   LEFT OVARY AND FALLOPIAN TUBE:        SUBCAPSULAR CORPUS LUTEUM OF OVARY.      ,  pap NIL  2009 [facility] LSIL, KEITH I  Only ever low grade, ASCUS or NIL     5/2 TSH WNL,  A1c 5.5%, CBC and CMP WNL      GYN US: 3.1 x 2.4 x 1.9 cm ROV normal   CT [facility] ROV enlarged, heterogeneous 4.6 x 3.6 cm    Denies any changes today. States still frequent pain. Strongly desires removal of remaining ovary.    ROS  Review of Systems   Constitutional: Negative.    HENT: Negative.     Respiratory: Negative.     Cardiovascular: Negative.    Gastrointestinal:  Positive for constipation.   Genitourinary:  Positive for dyspareunia and pelvic pain. Negative for vaginal bleeding.   Skin: Negative.    Psychiatric/Behavioral: Negative.       OBSTETRIC HISTORY  OB History    Para Term  AB Living   3 3       3   SAB IAB Ectopic Molar Multiple Live Births             3      # Outcome Date GA Lbr Jamey/2nd Weight Sex Delivery Anes PTL Lv   3 Para      CS-LTranv      2 Para      CS-LTranv      1 Para      CS-LTranv        GYN HISTORY  Hysterectomy in   History of STIs: denies  Last pap smear:   Abnormal pap smear history: LSIL, thinks just colposcopies, per notes LEEP?  Contraception: hyst    PAST MEDICAL HISTORY  Past Medical History:   Diagnosis Date    Abnormal weight gain     Acquired keratosis pilaris     Adult BMI > 30     Anemia     Bunion     Dysuria     KATHY (generalized anxiety disorder)     Headache     Low back pain     Need for immunization against influenza     Rash     Recurrent major depression     Spasm of back muscles     Trochanteric tendinitis     bursitis    Urinary tract infection      PAST SURGICAL HISTORY  Past Surgical History:   Procedure Laterality Date     SECTION      x 3    CHOLECYSTECTOMY      CHOLECYSTECTOMY WITH INTRAOPERATIVE CHOLANGIOGRAM N/A 2017    Procedure: LAPAROSCOPIC CHOLECYSTECTOMY WITH CHOLANGIOGRAM  (C-ARM#2);  Surgeon: Luis Cummins MD;  Location: [facility];  Service:     HYSTEROSCOPY  2009    INJECTION OF MEDICATION  2016    kenalog    INJECTION OF MEDICATION  2016    toradol    INJECTION OF  MEDICATION  2016    zofran    LAPAROSCOPY DIAGNOSTIC / BIOPSY / ASPIRATION / LYSIS  2013    PAP SMEAR  2012    SALPINGO OOPHORECTOMY  2013    SALPINGO OOPHORECTOMY Left     right ovary intact    TOTAL ABDOMINAL HYSTERECTOMY  2014     FAMILY HISTORY  Family History   Problem Relation Age of Onset    Depression Mother     Diabetes Mother     Breast cancer Maternal Grandmother     Diabetes Maternal Grandmother     Hypertension Maternal Grandmother     Hypertension Paternal Grandfather     Coronary artery disease Maternal Grandfather     Hypertension Maternal Grandfather      SOCIAL HISTORY  Social History     Socioeconomic History    Marital status: Single   Tobacco Use    Smoking status: Former     Packs/day: 0.00     Years: 5.00     Pack years: 0.00     Types: Cigarettes     Start date: 2002     Quit date: 2009     Years since quittin.5    Smokeless tobacco: Never   Vaping Use    Vaping Use: Never used   Substance and Sexual Activity    Alcohol use: Yes     Comment: occasional    Drug use: No    Sexual activity: Yes     Partners: Male     Birth control/protection: None, Hysterectomy     Comment: Hysterectomy     ALLERGIES  No Known Allergies  HOME MEDICATIONS  Prior to Admission medications    Medication Sig Start Date End Date Taking? Authorizing Provider   Ventolin  (90 Base) MCG/ACT inhaler Inhale 2 puffs Every 4 (Four) Hours As Needed for Wheezing. 21  Yes Dahiana Lara MD   Semaglutide-Weight Management (Wegovy) 0.25 MG/0.5ML solution auto-injector Inject 0.25 mg under the skin into the appropriate area as directed 1 (One) Time Per Week.  Patient not taking: Reported on 2023 5/3/23   Sita Nunez APRN   Symbicort 160-4.5 MCG/ACT inhaler INHALE 2 PUFFS INTO THE LUNGS TWICE A DAY  Patient taking differently: 2 puffs 2 (Two) Times a Day As Needed. 21   Dahiana Lara MD     PHYSICAL EXAM  Vitals:    23 1118   BP: 116/82   Weight: 113  "kg (248 lb 12.8 oz)   Height: 175.3 cm (69\")     General: No acute distress.  Well nourished.  Alert and oriented x 3.  Heart: RRR  Lungs: no increased work of breathing  Abdomen: Soft. Nontender. No rebound or guarding.  Pelvic: see 6/7 note    LABS  WBC   Date Value Ref Range Status   07/25/2023 6.56 3.40 - 10.80 10*3/mm3 Final     RBC   Date Value Ref Range Status   07/25/2023 4.03 3.77 - 5.28 10*6/mm3 Final     Hemoglobin   Date Value Ref Range Status   07/25/2023 12.0 12.0 - 15.9 g/dL Final     Hematocrit   Date Value Ref Range Status   07/25/2023 34.9 34.0 - 46.6 % Final     MCV   Date Value Ref Range Status   07/25/2023 86.6 79.0 - 97.0 fL Final     MCH   Date Value Ref Range Status   07/25/2023 29.8 26.6 - 33.0 pg Final     MCHC   Date Value Ref Range Status   07/25/2023 34.4 31.5 - 35.7 g/dL Final     RDW   Date Value Ref Range Status   07/25/2023 13.3 12.3 - 15.4 % Final     RDW-SD   Date Value Ref Range Status   07/25/2023 41.5 37.0 - 54.0 fl Final     MPV   Date Value Ref Range Status   07/25/2023 9.8 6.0 - 12.0 fL Final     Platelets   Date Value Ref Range Status   07/25/2023 339 140 - 450 10*3/mm3 Final     Neutrophil %   Date Value Ref Range Status   07/25/2023 52.3 42.7 - 76.0 % Final     Lymphocyte %   Date Value Ref Range Status   07/25/2023 38.1 19.6 - 45.3 % Final     Monocyte %   Date Value Ref Range Status   07/25/2023 6.1 5.0 - 12.0 % Final     Eosinophil %   Date Value Ref Range Status   07/25/2023 2.1 0.3 - 6.2 % Final     Basophil %   Date Value Ref Range Status   07/25/2023 0.8 0.0 - 1.5 % Final     Immature Grans %   Date Value Ref Range Status   07/25/2023 0.6 (H) 0.0 - 0.5 % Final     Neutrophils, Absolute   Date Value Ref Range Status   07/25/2023 3.43 1.70 - 7.00 10*3/mm3 Final     Lymphocytes, Absolute   Date Value Ref Range Status   07/25/2023 2.50 0.70 - 3.10 10*3/mm3 Final     Monocytes, Absolute   Date Value Ref Range Status   07/25/2023 0.40 0.10 - 0.90 10*3/mm3 Final "     Eosinophils, Absolute   Date Value Ref Range Status   2023 0.14 0.00 - 0.40 10*3/mm3 Final     Basophils, Absolute   Date Value Ref Range Status   2023 0.05 0.00 - 0.20 10*3/mm3 Final     Immature Grans, Absolute   Date Value Ref Range Status   2023 0.04 0.00 - 0.05 10*3/mm3 Final     nRBC   Date Value Ref Range Status   2023 0.0 0.0 - 0.2 /100 WBC Final     Lab Results   Component Value Date    GLUCOSE 98 2023    BUN 9 2023    CREATININE 0.61 2023    EGFR 116.8 2023    BCR 14.8 2023    K 4.2 2023    CO2 25.0 2023    CALCIUM 8.8 2023    ALBUMIN 4.2 2023    BILITOT 0.6 2023    AST 25 2023    ALT 28 2023         IMAGING  See above    IMPRESSION  Iris Casandra Alan is a 38 y.o.  presenting with pelvic pain with h/o possible adnexal cyst on ROV, prior SAM and LSO, h/o endometriosis, desiring to proceed with dx lsc, RO.     PLAN    1. Pelvic pain  2. History of endometriosis  3. History of ovarian cyst  4. History of hysterectomy  - Patient strongly desires right oophorectomy due to peristent pelvic pain; discussed recent US without finding of cyst  - Does have h/o endometriosis, SAM, LSO  - States pain x 2 years and monitoring cysts x 3 years and wants to proceed with definitive management  - Discussed risk that performing surgery will not cure pain and that if any complications arose or scar tissue/adhesions formed, could potentially increase risk of further pelvic pain  - Discussed suspect will have significant pelvic adhesions due to prior surgeries: CS x3, SAM, LSO which could increase risk of bladder, bowel, blood vessel, ureteral injury  - Discussed risks/benefits of surgery: risk of infection, bleeding, damage to surrounding structures, need for additional procedures; discussed risk of anesthesia including heart attack, stroke, and death; plan for outpatient surgery discussed.  - Scheduled for  RA-laparoscopic right oophorectomy, possible excision of endometriosis, any other indicated procedures  - Medications:   - Iris Alan and I have discussed pain goals for this hospitalization after reviewing her current clinical condition, medical history and prior pain experiences.  The goal is to keep her pain level manageable. Pain medications: tylenol, ibuprofen, tramadol or oxycodone pending surgical course   - Antibiotics: not indicated   - Nausea: zofran   - Bowel regimen: colace   - Anticoagulation: not indicated  - Labs: see above  - DVT prophylaxis: SCDs  - GI prophylaxis: Pepcid BID  - Disposition: if able to perform laparoscopically will plan for outpatient procedure; risk of needing laparotomy due to prior surgeries, endometriosis, risk of scar tissue which would require 1-2 nights in hospital        This document has been electronically signed by Rowena Phillips DO on July 25, 2023 11:45 CDT

## 2023-08-03 ENCOUNTER — HOSPITAL ENCOUNTER (OUTPATIENT)
Facility: HOSPITAL | Age: 38
Setting detail: HOSPITAL OUTPATIENT SURGERY
Discharge: HOME | End: 2023-08-03
Attending: STUDENT IN AN ORGANIZED HEALTH CARE EDUCATION/TRAINING PROGRAM | Admitting: STUDENT IN AN ORGANIZED HEALTH CARE EDUCATION/TRAINING PROGRAM
Payer: COMMERCIAL

## 2023-08-03 ENCOUNTER — ANESTHESIA (OUTPATIENT)
Dept: PERIOP | Facility: HOSPITAL | Age: 38
End: 2023-08-03
Payer: COMMERCIAL

## 2023-08-03 ENCOUNTER — ANESTHESIA EVENT (OUTPATIENT)
Dept: PERIOP | Facility: HOSPITAL | Age: 38
End: 2023-08-03
Payer: COMMERCIAL

## 2023-08-03 VITALS
TEMPERATURE: 96.8 F | SYSTOLIC BLOOD PRESSURE: 112 MMHG | DIASTOLIC BLOOD PRESSURE: 63 MMHG | HEIGHT: 69 IN | WEIGHT: 250 LBS | OXYGEN SATURATION: 96 % | BODY MASS INDEX: 37.03 KG/M2 | HEART RATE: 76 BPM | RESPIRATION RATE: 16 BRPM

## 2023-08-03 DIAGNOSIS — R10.2 PELVIC PAIN: ICD-10-CM

## 2023-08-03 DIAGNOSIS — Z90.721 S/P RIGHT OOPHORECTOMY: Primary | ICD-10-CM

## 2023-08-03 DIAGNOSIS — N83.201 CYST OF RIGHT OVARY: ICD-10-CM

## 2023-08-03 DIAGNOSIS — Z87.42 HISTORY OF ENDOMETRIOSIS: ICD-10-CM

## 2023-08-03 DIAGNOSIS — Z87.42 HISTORY OF OVARIAN CYST: ICD-10-CM

## 2023-08-03 LAB
ABO GROUP BLD: NORMAL
BLD GP AB SCN SERPL QL: NEGATIVE
Lab: NORMAL
RH BLD: POSITIVE
T&S EXPIRATION DATE: NORMAL

## 2023-08-03 PROCEDURE — 25010000002 FENTANYL CITRATE (PF) 100 MCG/2ML SOLUTION: Performed by: NURSE ANESTHETIST, CERTIFIED REGISTERED

## 2023-08-03 PROCEDURE — 25010000002 DEXAMETHASONE PER 1 MG: Performed by: NURSE ANESTHETIST, CERTIFIED REGISTERED

## 2023-08-03 PROCEDURE — 86850 RBC ANTIBODY SCREEN: CPT | Performed by: STUDENT IN AN ORGANIZED HEALTH CARE EDUCATION/TRAINING PROGRAM

## 2023-08-03 PROCEDURE — 25010000002 BUPIVACAINE (PF) 0.25 % SOLUTION: Performed by: STUDENT IN AN ORGANIZED HEALTH CARE EDUCATION/TRAINING PROGRAM

## 2023-08-03 PROCEDURE — 25010000002 KETOROLAC TROMETHAMINE PER 15 MG: Performed by: NURSE ANESTHETIST, CERTIFIED REGISTERED

## 2023-08-03 PROCEDURE — 86900 BLOOD TYPING SEROLOGIC ABO: CPT | Performed by: STUDENT IN AN ORGANIZED HEALTH CARE EDUCATION/TRAINING PROGRAM

## 2023-08-03 PROCEDURE — 25010000002 ONDANSETRON PER 1 MG: Performed by: NURSE ANESTHETIST, CERTIFIED REGISTERED

## 2023-08-03 PROCEDURE — 86901 BLOOD TYPING SEROLOGIC RH(D): CPT | Performed by: STUDENT IN AN ORGANIZED HEALTH CARE EDUCATION/TRAINING PROGRAM

## 2023-08-03 PROCEDURE — 25010000002 PROPOFOL 200 MG/20ML EMULSION: Performed by: NURSE ANESTHETIST, CERTIFIED REGISTERED

## 2023-08-03 PROCEDURE — 25010000002 HYDROMORPHONE 1 MG/ML SOLUTION: Performed by: NURSE ANESTHETIST, CERTIFIED REGISTERED

## 2023-08-03 PROCEDURE — 58661 LAPAROSCOPY REMOVE ADNEXA: CPT | Performed by: STUDENT IN AN ORGANIZED HEALTH CARE EDUCATION/TRAINING PROGRAM

## 2023-08-03 PROCEDURE — 25010000002 MIDAZOLAM PER 1 MG: Performed by: NURSE ANESTHETIST, CERTIFIED REGISTERED

## 2023-08-03 PROCEDURE — 25010000002 SUGAMMADEX 200 MG/2ML SOLUTION: Performed by: NURSE ANESTHETIST, CERTIFIED REGISTERED

## 2023-08-03 PROCEDURE — S2900 ROBOTIC SURGICAL SYSTEM: HCPCS | Performed by: STUDENT IN AN ORGANIZED HEALTH CARE EDUCATION/TRAINING PROGRAM

## 2023-08-03 PROCEDURE — 58661 LAPAROSCOPY REMOVE ADNEXA: CPT

## 2023-08-03 RX ORDER — TRAMADOL HYDROCHLORIDE 50 MG/1
50 TABLET ORAL EVERY 6 HOURS PRN
Qty: 12 TABLET | Refills: 0 | Status: SHIPPED | OUTPATIENT
Start: 2023-08-03 | End: 2024-08-02

## 2023-08-03 RX ORDER — NALOXONE HCL 0.4 MG/ML
0.4 VIAL (ML) INJECTION AS NEEDED
Status: DISCONTINUED | OUTPATIENT
Start: 2023-08-03 | End: 2023-08-03 | Stop reason: HOSPADM

## 2023-08-03 RX ORDER — DEXAMETHASONE SODIUM PHOSPHATE 4 MG/ML
INJECTION, SOLUTION INTRA-ARTICULAR; INTRALESIONAL; INTRAMUSCULAR; INTRAVENOUS; SOFT TISSUE AS NEEDED
Status: DISCONTINUED | OUTPATIENT
Start: 2023-08-03 | End: 2023-08-03 | Stop reason: SURG

## 2023-08-03 RX ORDER — MIDAZOLAM HYDROCHLORIDE 1 MG/ML
INJECTION INTRAMUSCULAR; INTRAVENOUS AS NEEDED
Status: DISCONTINUED | OUTPATIENT
Start: 2023-08-03 | End: 2023-08-03 | Stop reason: SURG

## 2023-08-03 RX ORDER — FLUMAZENIL 0.1 MG/ML
0.2 INJECTION INTRAVENOUS AS NEEDED
Status: DISCONTINUED | OUTPATIENT
Start: 2023-08-03 | End: 2023-08-03 | Stop reason: HOSPADM

## 2023-08-03 RX ORDER — LIDOCAINE HYDROCHLORIDE 20 MG/ML
INJECTION, SOLUTION EPIDURAL; INFILTRATION; INTRACAUDAL; PERINEURAL AS NEEDED
Status: DISCONTINUED | OUTPATIENT
Start: 2023-08-03 | End: 2023-08-03 | Stop reason: SURG

## 2023-08-03 RX ORDER — SODIUM CHLORIDE 0.9 % (FLUSH) 0.9 %
10 SYRINGE (ML) INJECTION AS NEEDED
Status: DISCONTINUED | OUTPATIENT
Start: 2023-08-03 | End: 2023-08-03 | Stop reason: HOSPADM

## 2023-08-03 RX ORDER — IBUPROFEN 800 MG/1
800 TABLET ORAL EVERY 8 HOURS PRN
Qty: 40 TABLET | Refills: 0 | Status: SHIPPED | OUTPATIENT
Start: 2023-08-03

## 2023-08-03 RX ORDER — MEPERIDINE HYDROCHLORIDE 25 MG/ML
12.5 INJECTION INTRAMUSCULAR; INTRAVENOUS; SUBCUTANEOUS
Status: DISCONTINUED | OUTPATIENT
Start: 2023-08-03 | End: 2023-08-03 | Stop reason: HOSPADM

## 2023-08-03 RX ORDER — ROCURONIUM BROMIDE 10 MG/ML
INJECTION, SOLUTION INTRAVENOUS AS NEEDED
Status: DISCONTINUED | OUTPATIENT
Start: 2023-08-03 | End: 2023-08-03 | Stop reason: SURG

## 2023-08-03 RX ORDER — ACETAMINOPHEN 325 MG/1
650 TABLET ORAL ONCE AS NEEDED
Status: DISCONTINUED | OUTPATIENT
Start: 2023-08-03 | End: 2023-08-03 | Stop reason: HOSPADM

## 2023-08-03 RX ORDER — DIPHENHYDRAMINE HYDROCHLORIDE 50 MG/ML
12.5 INJECTION INTRAMUSCULAR; INTRAVENOUS
Status: DISCONTINUED | OUTPATIENT
Start: 2023-08-03 | End: 2023-08-03 | Stop reason: HOSPADM

## 2023-08-03 RX ORDER — SODIUM CHLORIDE 9 MG/ML
40 INJECTION, SOLUTION INTRAVENOUS AS NEEDED
Status: DISCONTINUED | OUTPATIENT
Start: 2023-08-03 | End: 2023-08-03 | Stop reason: HOSPADM

## 2023-08-03 RX ORDER — PROPOFOL 10 MG/ML
INJECTION, EMULSION INTRAVENOUS AS NEEDED
Status: DISCONTINUED | OUTPATIENT
Start: 2023-08-03 | End: 2023-08-03 | Stop reason: SURG

## 2023-08-03 RX ORDER — ACETAMINOPHEN 325 MG/1
650 TABLET ORAL EVERY 4 HOURS PRN
Qty: 50 TABLET | Refills: 1 | Status: SHIPPED | OUTPATIENT
Start: 2023-08-03 | End: 2024-08-02

## 2023-08-03 RX ORDER — ONDANSETRON 2 MG/ML
4 INJECTION INTRAMUSCULAR; INTRAVENOUS ONCE AS NEEDED
Status: DISCONTINUED | OUTPATIENT
Start: 2023-08-03 | End: 2023-08-03 | Stop reason: HOSPADM

## 2023-08-03 RX ORDER — SODIUM CHLORIDE 0.9 % (FLUSH) 0.9 %
3 SYRINGE (ML) INJECTION EVERY 12 HOURS SCHEDULED
Status: DISCONTINUED | OUTPATIENT
Start: 2023-08-03 | End: 2023-08-03 | Stop reason: HOSPADM

## 2023-08-03 RX ORDER — FENTANYL CITRATE 50 UG/ML
INJECTION, SOLUTION INTRAMUSCULAR; INTRAVENOUS AS NEEDED
Status: DISCONTINUED | OUTPATIENT
Start: 2023-08-03 | End: 2023-08-03 | Stop reason: SURG

## 2023-08-03 RX ORDER — PROMETHAZINE HYDROCHLORIDE 25 MG/1
25 TABLET ORAL ONCE AS NEEDED
Status: DISCONTINUED | OUTPATIENT
Start: 2023-08-03 | End: 2023-08-03 | Stop reason: HOSPADM

## 2023-08-03 RX ORDER — BUPIVACAINE HYDROCHLORIDE 2.5 MG/ML
INJECTION, SOLUTION EPIDURAL; INFILTRATION; INTRACAUDAL AS NEEDED
Status: DISCONTINUED | OUTPATIENT
Start: 2023-08-03 | End: 2023-08-03 | Stop reason: HOSPADM

## 2023-08-03 RX ORDER — EPHEDRINE SULFATE 50 MG/ML
5 INJECTION, SOLUTION INTRAVENOUS ONCE AS NEEDED
Status: DISCONTINUED | OUTPATIENT
Start: 2023-08-03 | End: 2023-08-03 | Stop reason: HOSPADM

## 2023-08-03 RX ORDER — KETOROLAC TROMETHAMINE 30 MG/ML
INJECTION, SOLUTION INTRAMUSCULAR; INTRAVENOUS AS NEEDED
Status: DISCONTINUED | OUTPATIENT
Start: 2023-08-03 | End: 2023-08-03 | Stop reason: SURG

## 2023-08-03 RX ORDER — PROMETHAZINE HYDROCHLORIDE 25 MG/1
25 SUPPOSITORY RECTAL ONCE AS NEEDED
Status: DISCONTINUED | OUTPATIENT
Start: 2023-08-03 | End: 2023-08-03 | Stop reason: HOSPADM

## 2023-08-03 RX ORDER — ONDANSETRON 2 MG/ML
INJECTION INTRAMUSCULAR; INTRAVENOUS AS NEEDED
Status: DISCONTINUED | OUTPATIENT
Start: 2023-08-03 | End: 2023-08-03 | Stop reason: SURG

## 2023-08-03 RX ORDER — SODIUM CHLORIDE, SODIUM LACTATE, POTASSIUM CHLORIDE, CALCIUM CHLORIDE 600; 310; 30; 20 MG/100ML; MG/100ML; MG/100ML; MG/100ML
125 INJECTION, SOLUTION INTRAVENOUS CONTINUOUS
Status: DISCONTINUED | OUTPATIENT
Start: 2023-08-03 | End: 2023-08-03 | Stop reason: HOSPADM

## 2023-08-03 RX ADMIN — MIDAZOLAM HYDROCHLORIDE 2 MG: 1 INJECTION, SOLUTION INTRAMUSCULAR; INTRAVENOUS at 11:41

## 2023-08-03 RX ADMIN — ONDANSETRON 4 MG: 2 INJECTION INTRAMUSCULAR; INTRAVENOUS at 11:41

## 2023-08-03 RX ADMIN — FENTANYL CITRATE 25 MCG: 50 INJECTION, SOLUTION INTRAMUSCULAR; INTRAVENOUS at 12:22

## 2023-08-03 RX ADMIN — SODIUM CHLORIDE, POTASSIUM CHLORIDE, SODIUM LACTATE AND CALCIUM CHLORIDE 125 ML/HR: 600; 310; 30; 20 INJECTION, SOLUTION INTRAVENOUS at 09:34

## 2023-08-03 RX ADMIN — DEXAMETHASONE SODIUM PHOSPHATE 4 MG: 4 INJECTION, SOLUTION INTRAMUSCULAR; INTRAVENOUS at 11:59

## 2023-08-03 RX ADMIN — PROPOFOL 200 MG: 10 INJECTION, EMULSION INTRAVENOUS at 11:50

## 2023-08-03 RX ADMIN — FENTANYL CITRATE 50 MCG: 50 INJECTION, SOLUTION INTRAMUSCULAR; INTRAVENOUS at 11:49

## 2023-08-03 RX ADMIN — LIDOCAINE HYDROCHLORIDE 100 MG: 20 INJECTION, SOLUTION EPIDURAL; INFILTRATION; INTRACAUDAL; PERINEURAL at 11:49

## 2023-08-03 RX ADMIN — SUGAMMADEX 200 MG: 100 INJECTION, SOLUTION INTRAVENOUS at 12:56

## 2023-08-03 RX ADMIN — KETOROLAC TROMETHAMINE 30 MG: 30 INJECTION, SOLUTION INTRAMUSCULAR; INTRAVENOUS at 12:49

## 2023-08-03 RX ADMIN — FENTANYL CITRATE 25 MCG: 50 INJECTION, SOLUTION INTRAMUSCULAR; INTRAVENOUS at 12:13

## 2023-08-03 RX ADMIN — ROCURONIUM BROMIDE 50 MG: 50 INJECTION INTRAVENOUS at 11:49

## 2023-08-03 RX ADMIN — HYDROMORPHONE HYDROCHLORIDE 0.5 MG: 1 INJECTION, SOLUTION INTRAMUSCULAR; INTRAVENOUS; SUBCUTANEOUS at 13:30

## 2023-08-03 NOTE — OP NOTE
[facility]  Operative Report    Name: Iris Alan  MRN: [id number]  Date: 8/3/2023  CSN: [id number]      Location: [facility]    Service: Gynecology    Pre-op Diagnosis: Pelvic pain, history of endometriosis, history of hysterectomy, prior left oophorectomy, history of ovarian cyst, request right oophorectomy    Post-op Diagnosis: Same, status post right oophorectomy    Surgeon: Rowena Phillips DO    Assistant: Keshia Adams, CST was responsible for performing the following activities: Retraction, Closing, Placing Dressing, and Held/Positioned Camera and their skilled assistance was necessary for the success of this case.    Staff:  Circulator: Samantha Rosario RN; Paris Perez RN  Scrub Person: Estela De León; Ravi Matthew  Assistant: Keshia Adams CSFA    Anesthesia: General    Anesthesia Staff:  Anesthesiologist: Pratik Finch MD  CRNA: Arlene March CRNA    Operation: Robotic assisted diagnostic laparoscopy, right oophorectomy, lysis of adhesions    Drains: Johnson removed prior to the end the case    Complications: None    Findings: Absent uterus, bilateral fallopian tubes, left ovary; small scarring in the pelvis from prior hysterectomy; minimal adhesions from the omentum to the left pelvic sidewall; small Filshie clip within adhesions near the rectum removed; normal-appearing right ovary with minimal adhesions to the right pelvic sidewall; right ureter visualized and medial to the excision site    Condition: Stable    Specimens/Disposition: Right ovary    Estimated Blood Loss: Less than 10 mL  IV Fluids: Not recorded in epic mL  Urine Output: 150 mL    Indications: Iris Alan, 38 y.o., G 3 P 3 with chief complaint of chronic pelvic pain, history of endometriosis, history of right ovarian cyst, prior hysterectomy and left oophorectomy, desiring removal of right ovary.    Description of Operation:  After appropriate consents were obtained, the  patient was taken to the operating room.  The patient was identified and the procedure verified.  The patient was given general endotracheal anesthesia and placed in the dorsal lithotomy position.  She was draped, prepped in the usual sterile manner. Time out was performed and procedure verified.      Johnson catheter was placed, removed prior to the end of the case.       Attention was then turned towards the abdomen.  An incision was made above the umbilicus after injecting local anesthesia. An 8 mm trocar sleeve and scope were placed under direct visualization. Pneumoperitoneum was established with initial pressure <8 mm Hg. The scope was inserted again and visualized no injury to underlying bowel or tissues.  The above findings were noted.  An 8 mm incision was made lateral to the umbilicus after injecting local anesthesia on the right side through which a 8 mm trocar and sleeve were passed through under direct visualization without difficulty.  An 8 mm incision was made lateral to the umbilicus on the left side after injecting local anesthesia, through which an 8 mm trocar and sleeve were passed through under direct visualization.  An assistant 8 mm port was then placed laterally to the right in a parallel line from ports under direct visualization. The patient was placed in Trendelenburg positioning. The Globiali robot was then docked and instruments inserted under direct visualization.      The Filshie clip that was evaluated in the rectum was removed with the vessel sealer as adhesions were thin and filmy and away from the bowel.  The Vessel Sealer was then used to clamp, cauterize, and cut the IP ligament on the right.  Then adhesions were also transected.  The Filshie clip and right ovary were then placed in a robotic bag.  This was removed from the assistant port, elongating the assistant port site just slightly.  After removal the fascia at the site was closed with the Ruiz-Kee device.  All  instruments were removed and the DaVinci robot was then undocked and trocars removed. The intraabdominal carbon dioxide was allowed to escape.  The skin incisions were closed with 3-0 Monocryl. Sponge, needle, and instrument counts were correct x2.  There were no intraoperative complications, and patient tolerated the procedure well.  She was extubated and escorted to the recovery room in stable condition.    Antibiotic prophylaxis was not indicated for this procedure.        This document has been electronically signed by Rowena Phillips DO on August 3, 2023 13:29 CDT

## 2023-08-03 NOTE — INTERVAL H&P NOTE
"38 y.o.  premenopausal female who presents with complaints of pelvic pain, worried about cyst OV scheduled for RA-laparoscopic right oophorectomy, possible excision of endometriosis, any other indicated procedures. States pain unchanged. No concerns otherwise. Desires to proceed. NO worsening symptoms or other new conditions.    Vitals:    23 0930   BP: 104/64   BP Location: Right arm   Patient Position: Lying   Pulse: 87   Resp: 18   Temp: 96.9 øF (36.1 øC)   TempSrc: Temporal   SpO2: 97%   Weight: 113 kg (250 lb)   Height: 175.3 cm (69\")     Gen: well apparing, NAD  CV: RRR  Chest: no increased work of breathing  Abd: nontender    No results found for: WBC, RBC, HGB, HCT, MCV, MCH, MCHC, RDW, RDWSD, MPV, PLT, NEUTRORELPCT, LYMPHORELPCT, MONORELPCT, EOSRELPCT, BASORELPCT, AUTOIGPER, NEUTROABS, LYMPHSABS, MONOSABS, EOSABS, BASOSABS, AUTOIGNUM, NRBC.  Lab Results   Component Value Date    GLUCOSE 98 2023    BUN 9 2023    CREATININE 0.61 2023    EGFR 116.8 2023    BCR 14.8 2023    K 4.2 2023    CO2 25.0 2023    CALCIUM 8.8 2023    ALBUMIN 4.2 2023    BILITOT 0.6 2023    AST 25 2023    ALT 28 2023     38 y.o.  premenopausal female who presents with complaints of pelvic pain, worried about cyst OV scheduled for RA-laparoscopic right oophorectomy, possible excision of endometriosis, any other indicated procedures.   To OR for procedure as above  Again discussed risk of requiring laparotomy, risk of persistent pain  See H&P for further discussion.        This document has been electronically signed by Rowena Phillips DO on August 3, 2023 11:47 CDT    "

## 2023-08-08 LAB — REF LAB TEST METHOD: NORMAL

## 2023-08-21 ENCOUNTER — OFFICE VISIT (OUTPATIENT)
Dept: OBSTETRICS AND GYNECOLOGY | Facility: CLINIC | Age: 38
End: 2023-08-21
Payer: COMMERCIAL

## 2023-08-21 VITALS
WEIGHT: 252.6 LBS | HEIGHT: 69 IN | BODY MASS INDEX: 37.41 KG/M2 | SYSTOLIC BLOOD PRESSURE: 130 MMHG | DIASTOLIC BLOOD PRESSURE: 88 MMHG

## 2023-08-21 DIAGNOSIS — Z48.89 POSTOPERATIVE VISIT: Primary | ICD-10-CM

## 2023-08-21 PROCEDURE — 99024 POSTOP FOLLOW-UP VISIT: CPT | Performed by: STUDENT IN AN ORGANIZED HEALTH CARE EDUCATION/TRAINING PROGRAM

## 2023-08-21 RX ORDER — ESTRADIOL 0.05 MG/D
1 PATCH, EXTENDED RELEASE TRANSDERMAL WEEKLY
Qty: 8 PATCH | Refills: 11 | Status: SHIPPED | OUTPATIENT
Start: 2023-08-21

## 2023-08-21 NOTE — PROGRESS NOTES
"[facility]  Gynecology  Post-operative Visit    DOS: 8/3/23  Pre-op diagnosis: Pelvic pain, history of endometriosis, history of hysterectomy, prior left oophorectomy, history of ovarian cyst, request right oophorectomy   Procedure: Robotic assisted diagnostic laparoscopy, right oophorectomy, lysis of adhesions   Pathology:   DIAGNOSIS:   OVARY, RIGHT:   Benign follicular cyst and hemorrhagic pseudocyst, compatible with endometrioma   No evidence of malignancy   Findings: Absent uterus, bilateral fallopian tubes, left ovary; small scarring in the pelvis from prior hysterectomy; minimal adhesions from the omentum to the left pelvic sidewall; small Filshie clip within adhesions near the rectum removed; normal-appearing right ovary with minimal adhesions to the right pelvic sidewall; right ureter visualized and medial to the excision site     Patient presents for post-op visit.  She states she is overall doing well. No vaginal bleeding. Pain intermittent but improving overall. No N/V. Regular BM and urination. Is starting to have some hotflashes and nightsweats.    /88 (BP Location: Left arm, Patient Position: Sitting, Cuff Size: Adult)   Ht 175.3 cm (69\")   Wt 115 kg (252 lb 9.6 oz)   LMP  (LMP Unknown)   BMI 37.30 kg/mý   Gen: NAD, AAO x3  Chest: no increased work of breathing  Abd: nontender, incisions healing c/d/I, soft    A/P: Iris Casandra Alan is a 38 y.o.  s/p Robotic assisted diagnostic laparoscopy, right oophorectomy, lysis of adhesions  on 8/3/23.  Doing well.  - Reviewed pathology  - Doing well. Okay to slowly return to normal activities.  - BSO: prior LSO, now s/p RSO, desires to start HRT, will send Estradiol patches Vivelle Dot 0.05 mg to start, increase prn  - FU in 6 mos, sooner if needed  - If persistent pain, consider other cause and workup vs. Pelvic floor PT        This document has been electronically signed by Rowena Phillips DO on 2023 13:26 CDT    "

## 2024-05-15 NOTE — TELEPHONE ENCOUNTER
I guess put her in the next available.   
Iris saw ortho and she wants to see Jemma for her opinion on the results of her MRI and what the doctor told her.  She said has clinicals right now and is on her feet a lot and is having lower back pain.  There was nowhere on Jemma's sched to put her soon.  Please advise.    
Tried to call patient to get her scheduled.  
in [facility]:

## (undated) DEVICE — GOWN,AURORA,NOREINF,RAGLAN,XL,STERILE: Brand: MEDLINE

## (undated) DEVICE — GLV SURG TRIUMPH LT PF LTX 7.5 STRL

## (undated) DEVICE — MONOPOLAR METZENBAUM SCISSOR TIP, DISPOSABLE: Brand: MONOPOLAR METZENBAUM SCISSOR TIP, DISPOSABLE

## (undated) DEVICE — DRP SPECIAL PROC 4PC W/FC POUCH

## (undated) DEVICE — ENDOPATH XCEL BLADELESS TROCARS WITH STABILITY SLEEVES: Brand: ENDOPATH XCEL

## (undated) DEVICE — GLV SURG SENSICARE ALOE LF PF SZ7.5 GRN

## (undated) DEVICE — GLV SURG SENSICARE GREEN W/ALOE PF LF 8 STRL

## (undated) DEVICE — STERILE POLYISOPRENE POWDER-FREE SURGICAL GLOVES WITH EMOLLIENT COATING: Brand: PROTEXIS

## (undated) DEVICE — GLV SURG TRIUMPH LT PF LTX 6.5 STRL

## (undated) DEVICE — SKIN AFFIX SURG ADHESIVE 72/CS 0.55ML: Brand: MEDLINE

## (undated) DEVICE — GLV SURG SENSICARE GREEN W/ALOE PF LF 7 STRL

## (undated) DEVICE — GLV SURG TRIUMPH LT PF LTX 7 STRL

## (undated) DEVICE — SUT VIC 0/0 UR6 27IN DYED J603H

## (undated) DEVICE — PK LAP CHOLE LF 60

## (undated) DEVICE — STERILE POLYISOPRENE POWDER-FREE SURGICAL GLOVES: Brand: PROTEXIS

## (undated) DEVICE — GLV SURG SENSICARE GREEN W/ALOE PF LF 6.5 STRL

## (undated) DEVICE — ANTIBACTERIAL UNDYED BRAIDED (POLYGLACTIN 910), SYNTHETIC ABSORBABLE SUTURE: Brand: COATED VICRYL

## (undated) DEVICE — SUT VIC 2/0 SH 27IN